# Patient Record
Sex: MALE | Race: WHITE | NOT HISPANIC OR LATINO | Employment: UNEMPLOYED | URBAN - METROPOLITAN AREA
[De-identification: names, ages, dates, MRNs, and addresses within clinical notes are randomized per-mention and may not be internally consistent; named-entity substitution may affect disease eponyms.]

---

## 2021-03-13 LAB
EXTERNAL HIV CONFIRMATION: NORMAL
EXTERNAL HIV CONFIRMATION: NORMAL
EXTERNAL HIV SCREEN: NORMAL
EXTERNAL HIV SCREEN: NORMAL

## 2022-04-18 LAB
HCV AB SER-ACNC: NEGATIVE
HCV AB SER-ACNC: NEGATIVE

## 2022-10-28 ENCOUNTER — OFFICE VISIT (OUTPATIENT)
Dept: FAMILY MEDICINE CLINIC | Facility: CLINIC | Age: 32
End: 2022-10-28
Payer: COMMERCIAL

## 2022-10-28 VITALS
BODY MASS INDEX: 29.59 KG/M2 | TEMPERATURE: 97.4 F | DIASTOLIC BLOOD PRESSURE: 70 MMHG | HEIGHT: 69 IN | RESPIRATION RATE: 16 BRPM | OXYGEN SATURATION: 99 % | SYSTOLIC BLOOD PRESSURE: 128 MMHG | HEART RATE: 84 BPM | WEIGHT: 199.8 LBS

## 2022-10-28 DIAGNOSIS — Z13.6 SCREENING FOR CARDIOVASCULAR CONDITION: ICD-10-CM

## 2022-10-28 DIAGNOSIS — Z00.00 ANNUAL PHYSICAL EXAM: Primary | ICD-10-CM

## 2022-10-28 DIAGNOSIS — M79.10 MYALGIA: ICD-10-CM

## 2022-10-28 DIAGNOSIS — F32.5 MAJOR DEPRESSIVE DISORDER IN FULL REMISSION, UNSPECIFIED WHETHER RECURRENT (HCC): ICD-10-CM

## 2022-10-28 DIAGNOSIS — E78.1 HYPERTRIGLYCERIDEMIA: ICD-10-CM

## 2022-10-28 DIAGNOSIS — K58.9 IRRITABLE BOWEL SYNDROME, UNSPECIFIED TYPE: ICD-10-CM

## 2022-10-28 DIAGNOSIS — E66.3 OVERWEIGHT: ICD-10-CM

## 2022-10-28 DIAGNOSIS — Z23 ENCOUNTER FOR IMMUNIZATION: ICD-10-CM

## 2022-10-28 DIAGNOSIS — Z13.1 SCREENING FOR DIABETES MELLITUS: ICD-10-CM

## 2022-10-28 DIAGNOSIS — F41.9 ANXIETY: ICD-10-CM

## 2022-10-28 DIAGNOSIS — K21.00 GASTROESOPHAGEAL REFLUX DISEASE WITH ESOPHAGITIS, UNSPECIFIED WHETHER HEMORRHAGE: ICD-10-CM

## 2022-10-28 PROBLEM — K21.9 GERD (GASTROESOPHAGEAL REFLUX DISEASE): Status: ACTIVE | Noted: 2022-10-28

## 2022-10-28 PROBLEM — F32.A DEPRESSION: Status: ACTIVE | Noted: 2022-10-28

## 2022-10-28 PROCEDURE — 90686 IIV4 VACC NO PRSV 0.5 ML IM: CPT

## 2022-10-28 PROCEDURE — 99385 PREV VISIT NEW AGE 18-39: CPT | Performed by: FAMILY MEDICINE

## 2022-10-28 PROCEDURE — 90471 IMMUNIZATION ADMIN: CPT

## 2022-10-28 RX ORDER — AMITRIPTYLINE HYDROCHLORIDE 25 MG/1
1 TABLET, FILM COATED ORAL
COMMUNITY
Start: 2022-08-17 | End: 2022-10-28 | Stop reason: SDUPTHER

## 2022-10-28 RX ORDER — AMITRIPTYLINE HYDROCHLORIDE 25 MG/1
25 TABLET, FILM COATED ORAL
Qty: 90 TABLET | Refills: 0 | Status: SHIPPED | OUTPATIENT
Start: 2022-10-28

## 2022-10-28 NOTE — PATIENT INSTRUCTIONS
Please contact your insurance if you are uncertain of coverage for plan of care items  Your insurance may not cover the cost of your Vitamin D blood test, which is approximately $65-70  Please notify the lab prior to blood draw if you would like to decline this test           Wellness Visit for Adults   AMBULATORY CARE:   A wellness visit  is when you see your healthcare provider to get screened for health problems  Your healthcare provider will also give you advice on how to stay healthy  Write down your questions so you remember to ask them  Ask your healthcare provider how often you should have a wellness visit  What happens at a wellness visit:  Your healthcare provider will ask about your health, and your family history of health problems  This includes high blood pressure, heart disease, and cancer  He or she will ask if you have symptoms that concern you, if you smoke, and about your mood  You may also be asked about your intake of medicines, supplements, food, and alcohol  Any of the following may be done: Your weight  will be checked  Your height may also be checked so your body mass index (BMI) can be calculated  Your BMI shows if you are at a healthy weight  Your blood pressure  and heart rate will be checked  Your temperature may also be checked  Blood and urine tests  may be done  Blood tests may be done to check your cholesterol levels  Abnormal cholesterol levels increase your risk for heart disease and stroke  You may also need a blood or urine test to check for diabetes if you are at increased risk  Urine tests may be done to look for signs of an infection or kidney disease  A physical exam  includes checking your heartbeat and lungs with a stethoscope  Your healthcare provider may also check your skin to look for sun damage  Screening tests  may be recommended  A screening test is done to check for diseases that may not cause symptoms   The screening tests you may need depend on your age, gender, family history, and lifestyle habits  For example, colorectal screening may be recommended if you are 48years old or older  Screening tests you need if you are a woman:   A Pap smear  is used to screen for cervical cancer  Pap smears are usually done every 3 to 5 years depending on your age  You may need them more often if you have had abnormal Pap smear test results in the past  Ask your healthcare provider how often you should have a Pap smear  A mammogram  is an x-ray of your breasts to screen for breast cancer  Experts recommend mammograms every 2 years starting at age 48 years  You may need a mammogram at age 52 years or younger if you have an increased risk for breast cancer  Talk to your healthcare provider about when you should start having mammograms and how often you need them  Vaccines you may need:   Get an influenza vaccine  every year  The influenza vaccine protects you from the flu  Several types of viruses cause the flu  The viruses change over time, so new vaccines are made each year  Get a tetanus-diphtheria (Td) booster vaccine  every 10 years  This vaccine protects you against tetanus and diphtheria  Tetanus is a severe infection that may cause painful muscle spasms and lockjaw  Diphtheria is a severe bacterial infection that causes a thick covering in the back of your mouth and throat  Get a human papillomavirus (HPV) vaccine  if you are female and aged 23 to 32 or male 23 to 24 and never received it  This vaccine protects you from HPV infection  HPV is the most common infection spread by sexual contact  HPV may also cause vaginal, penile, and anal cancers  Get a pneumococcal vaccine  if you are aged 72 years or older  The pneumococcal vaccine is an injection given to protect you from pneumococcal disease  Pneumococcal disease is an infection caused by pneumococcal bacteria  The infection may cause pneumonia, meningitis, or an ear infection      Get a shingles vaccine  if you are 61 or older, even if you have had shingles before  The shingles vaccine is an injection to protect you from the varicella-zoster virus  This is the same virus that causes chickenpox  Shingles is a painful rash that develops in people who had chickenpox or have been exposed to the virus  How to eat healthy:  My Plate is a model for planning healthy meals  It shows the types and amounts of foods that should go on your plate  Fruits and vegetables make up about half of your plate, and grains and protein make up the other half  A serving of dairy is included on the side of your plate  The amount of calories and serving sizes you need depends on your age, gender, weight, and height  Examples of healthy foods are listed below:  Eat a variety of vegetables  such as dark green, red, and orange vegetables  You can also include canned vegetables low in sodium (salt) and frozen vegetables without added butter or sauces  Eat a variety of fresh fruits , canned fruit in 100% juice, frozen fruit, and dried fruit  Include whole grains  At least half of the grains you eat should be whole grains  Examples include whole-wheat bread, wheat pasta, brown rice, and whole-grain cereals such as oatmeal     Eat a variety of protein foods such as seafood (fish and shellfish), lean meat, and poultry without skin (turkey and chicken)  Examples of lean meats include pork leg, shoulder, or tenderloin, and beef round, sirloin, tenderloin, and extra lean ground beef  Other protein foods include eggs and egg substitutes, beans, peas, soy products, nuts, and seeds  Choose low-fat dairy products such as skim or 1% milk or low-fat yogurt, cheese, and cottage cheese  Limit unhealthy fats  such as butter, hard margarine, and shortening  Exercise:  Exercise at least 30 minutes per day on most days of the week  Some examples of exercise include walking, biking, dancing, and swimming   You can also fit in more physical activity by taking the stairs instead of the elevator or parking farther away from stores  Include muscle strengthening activities 2 days each week  Regular exercise provides many health benefits  It helps you manage your weight, and decreases your risk for type 2 diabetes, heart disease, stroke, and high blood pressure  Exercise can also help improve your mood  Ask your healthcare provider about the best exercise plan for you  General health and safety guidelines:   Do not smoke  Nicotine and other chemicals in cigarettes and cigars can cause lung damage  Ask your healthcare provider for information if you currently smoke and need help to quit  E-cigarettes or smokeless tobacco still contain nicotine  Talk to your healthcare provider before you use these products  Limit alcohol  A drink of alcohol is 12 ounces of beer, 5 ounces of wine, or 1½ ounces of liquor  Lose weight, if needed  Being overweight increases your risk of certain health conditions  These include heart disease, high blood pressure, type 2 diabetes, and certain types of cancer  Protect your skin  Do not sunbathe or use tanning beds  Use sunscreen with a SPF 15 or higher  Apply sunscreen at least 15 minutes before you go outside  Reapply sunscreen every 2 hours  Wear protective clothing, hats, and sunglasses when you are outside  Drive safely  Always wear your seatbelt  Make sure everyone in your car wears a seatbelt  A seatbelt can save your life if you are in an accident  Do not use your cell phone when you are driving  This could distract you and cause an accident  Pull over if you need to make a call or send a text message  Practice safe sex  Use latex condoms if are sexually active and have more than one partner  Your healthcare provider may recommend screening tests for sexually transmitted infections (STIs)  Wear helmets, lifejackets, and protective gear    Always wear a helmet when you ride a bike or motorcycle, go skiing, or play sports that could cause a head injury  Wear protective equipment when you play sports  Wear a lifejacket when you are on a boat or doing water sports  © Copyright [employer] 2022 Information is for End User's use only and may not be sold, redistributed or otherwise used for commercial purposes  All illustrations and images included in CareNotes® are the copyrighted property of [employer] M [employer]  or [employer]idy Case   The above information is an  only  It is not intended as medical advice for individual conditions or treatments  Talk to your doctor, nurse or pharmacist before following any medical regimen to see if it is safe and effective for you  Weight Management   AMBULATORY CARE:   Why it is important to manage your weight:  Being overweight increases your risk of health conditions such as heart disease, high blood pressure, type 2 diabetes, and certain types of cancer  It can also increase your risk for osteoarthritis, sleep apnea, and other respiratory problems  Aim for a slow, steady weight loss  Even a small amount of weight loss can lower your risk of health problems  Risks of being overweight:  Extra weight can cause many health problems, including the following:  Diabetes (high blood sugar level)    High blood pressure or high cholesterol    Heart disease    Stroke    Gallbladder or liver disease    Cancer of the colon, breast, prostate, liver, or kidney    Sleep apnea    Arthritis or gout    Screening  is done to check for health conditions before you have signs or symptoms  If you are 28to 79years old, your blood sugar level may be checked every 3 years for signs of prediabetes or diabetes  Your healthcare provider will check your blood pressure at each visit  High blood pressure can lead to a stroke or other problems  Your provider may check for signs of heart disease, cancer, or other health problems    How to lose weight safely:  A safe and healthy way to lose weight is to eat fewer calories and get regular exercise  You can lose up about 1 pound a week by decreasing the number of calories you eat by 500 calories each day  You can decrease calories by eating smaller portion sizes or by cutting out high-calorie foods  Read labels to find out how many calories are in the foods you eat  You can also burn calories with exercise such as walking, swimming, or biking  You will be more likely to keep weight off if you make these changes part of your lifestyle  Exercise at least 30 minutes per day on most days of the week  You can also fit in more physical activity by taking the stairs instead of the elevator or parking farther away from stores  Ask your healthcare provider about the best exercise plan for you  Healthy meal plan for weight management:  A healthy meal plan includes a variety of foods, contains fewer calories, and helps you stay healthy  A healthy meal plan includes the following:     Eat whole-grain foods more often  A healthy meal plan should contain fiber  Fiber is the part of grains, fruits, and vegetables that is not broken down by your body  Whole-grain foods are healthy and provide extra fiber in your diet  Some examples of whole-grain foods are whole-wheat breads and pastas, oatmeal, brown rice, and bulgur  Eat a variety of vegetables every day  Include dark, leafy greens such as spinach, kale, chana greens, and mustard greens  Eat yellow and orange vegetables such as carrots, sweet potatoes, and winter squash  Eat a variety of fruits every day  Choose fresh or canned fruit (canned in its own juice or light syrup) instead of juice  Fruit juice has very little or no fiber  Eat low-fat dairy foods  Drink fat-free (skim) milk or 1% milk  Eat fat-free yogurt and low-fat cottage cheese  Try low-fat cheeses such as mozzarella and other reduced-fat cheeses      Choose meat and other protein foods that are low in fat   Choose beans or other legumes such as split peas or lentils  Choose fish, skinless poultry (chicken or turkey), or lean cuts of red meat (beef or pork)  Before you cook meat or poultry, cut off any visible fat  Use less fat and oil  Try baking foods instead of frying them  Add less fat, such as margarine, sour cream, regular salad dressing and mayonnaise to foods  Eat fewer high-fat foods  Some examples of high-fat foods include french fries, doughnuts, ice cream, and cakes  Eat fewer sweets  Limit foods and drinks that are high in sugar  This includes candy, cookies, regular soda, and sweetened drinks  Ways to decrease calories:   Eat smaller portions  Use a small plate with smaller servings  Do not eat second helpings  When you eat at a restaurant, ask for a box and place half of your meal in the box before you eat  Share an entrée with someone else  Replace high-calorie snacks with healthy, low-calorie snacks  Choose fresh fruit, vegetables, fat-free rice cakes, or air-popped popcorn instead of potato chips, nuts, or chocolate  Choose water or calorie-free drinks instead of soda or sweetened drinks  Do not shop for groceries when you are hungry  You may be more likely to make unhealthy food choices  Take a grocery list of healthy foods and shop after you have eaten  Eat regular meals  Do not skip meals  Skipping meals can lead to overeating later in the day  This can make it harder for you to lose weight  Eat a healthy snack in place of a meal if you do not have time to eat a regular meal  Talk with a dietitian to help you create a meal plan and schedule that is right for you  Other things to consider as you try to lose weight:   Be aware of situations that may give you the urge to overeat, such as eating while watching television  Find ways to avoid these situations  For example, read a book, go for a walk, or do crafts      Meet with a weight loss support group or friends who are also trying to lose weight  This may help you stay motivated to continue working on your weight loss goals  © Copyright [employer] 2022 Information is for End User's use only and may not be sold, redistributed or otherwise used for commercial purposes  All illustrations and images included in CareNotes® are the copyrighted property of [employer]  or Cassidy Case   The above information is an  only  It is not intended as medical advice for individual conditions or treatments  Talk to your doctor, nurse or pharmacist before following any medical regimen to see if it is safe and effective for you  Cholesterol and Your Health   AMBULATORY CARE:   Cholesterol  is a waxy, fat-like substance  Your body uses cholesterol to make hormones and new cells, and to protect nerves  Cholesterol is made by your body  It also comes from certain foods you eat, such as meat and dairy products  Your healthcare provider can help you set goals for your cholesterol levels  He or she can help you create a plan to meet your goals  Cholesterol level goals: Your cholesterol level goals depend on your risk for heart disease, your age, and your other health conditions  The following are general guidelines: Total cholesterol  includes low-density lipoprotein (LDL), high-density lipoprotein (HDL), and triglyceride levels  The total cholesterol level should be lower than 200 mg/dL and is best at about 150 mg/dL  LDL cholesterol  is called bad cholesterol  because it forms plaque in your arteries  As plaque builds up, your arteries become narrow, and less blood flows through  When plaque decreases blood flow to your heart, you may have chest pain  If plaque completely blocks an artery that brings blood to your heart, you may have a heart attack  Plaque can break off and form blood clots  Blood clots may block arteries in your brain and cause a stroke   The level should be less than 130 mg/dL and is best at about 100 mg/dL  HDL cholesterol  is called good cholesterol  because it helps remove LDL cholesterol from your arteries  It does this by attaching to LDL cholesterol and carrying it to your liver  Your liver breaks down LDL cholesterol so your body can get rid of it  High levels of HDL cholesterol can help prevent a heart attack and stroke  Low levels of HDL cholesterol can increase your risk for heart disease, heart attack, and stroke  The level should be 60 mg/dL or higher  Triglycerides  are a type of fat that store energy from foods you eat  High levels of triglycerides also cause plaque buildup  This can increase your risk for a heart attack or stroke  If your triglyceride level is high, your LDL cholesterol level may also be high  The level should be less than 150 mg/dL  Any of the following can increase your risk for high cholesterol:   Smoking cigarettes    Being overweight or obese, or not getting enough exercise    Drinking large amounts of alcohol    A medical condition such as hypertension (high blood pressure) or diabetes    Certain genes passed from your parents to you    Age older than 65 years    What you need to know about having your cholesterol levels checked: Adults 21to 39years of age should have their cholesterol levels checked every 4 to 6 years  Adults 45 years or older should have their cholesterol checked every 1 to 2 years  You may need your cholesterol checked more often, or at a younger age, if you have risk factors for heart disease  You may also need to have your cholesterol checked more often if you have other health conditions, such as diabetes  Blood tests are used to check cholesterol levels  Blood tests measure your levels of triglycerides, LDL cholesterol, and HDL cholesterol  How healthy fats affect your cholesterol levels:  Healthy fats, also called unsaturated fats, help lower LDL cholesterol and triglyceride levels   Healthy fats include the following:  Monounsaturated fats  are found in foods such as olive oil, canola oil, avocado, nuts, and olives  Polyunsaturated fats,  such as omega 3 fats, are found in fish, such as salmon, trout, and tuna  They can also be found in plant foods such as flaxseed, walnuts, and soybeans  How unhealthy fats affect your cholesterol levels:  Unhealthy fats increase LDL cholesterol and triglyceride levels  They are found in foods high in cholesterol, saturated fat, and trans fat:  Cholesterol  is found in eggs, dairy, and meat  Saturated fat  is found in butter, cheese, ice cream, whole milk, and coconut oil  Saturated fat is also found in meat, such as sausage, hot dogs, and bologna  Trans fat  is found in liquid oils and is used in fried and baked foods  Foods that contain trans fats include chips, crackers, muffins, sweet rolls, microwave popcorn, and cookies  Treatment  for high cholesterol will also decrease your risk of heart disease, heart attack, and stroke  Treatment may include any of the following:  Lifestyle changes  may include food, exercise, weight loss, and quitting smoking  You may also need to decrease the amount of alcohol you drink  Your healthcare provider will want you to start with lifestyle changes  Other treatment may be added if lifestyle changes are not enough  Your healthcare provider may recommend you work with a team to manage hyperlipidemia  The team may include medical experts such as a dietitian, an exercise or physical therapist, and a behavior therapist  Your family members may be included in helping you create lifestyle changes  Medicines  may be given to lower your LDL cholesterol, triglyceride levels, or total cholesterol level  You may need medicines to lower your cholesterol if any of the following is true:    You have a history of stroke, TIA, unstable angina, or a heart attack  Your LDL cholesterol level is 190 mg/dL or higher      You are age 36 to 76 years, have diabetes or heart disease risk factors, and your LDL cholesterol is 70 mg/dL or higher  Supplements  include fish oil, red yeast rice, and garlic  Fish oil may help lower your triglyceride and LDL cholesterol levels  It may also increase your HDL cholesterol level  Red yeast rice may help decrease your total cholesterol level and LDL cholesterol level  Garlic may help lower your total cholesterol level  Do not take any supplements without talking to your healthcare provider  Food changes you can make to lower your cholesterol levels:  A dietitian can help you create a healthy eating plan  He or she can show you how to read food labels and choose foods low in saturated fat, trans fats, and cholesterol  Decrease the total amount of fat you eat  Choose lean meats, fat-free or 1% fat milk, and low-fat dairy products, such as yogurt and cheese  Try to limit or avoid red meats  Limit or do not eat fried foods or baked goods, such as cookies  Replace unhealthy fats with healthy fats  Cook foods in olive oil or canola oil  Choose soft margarines that are low in saturated fat and trans fat  Seeds, nuts, and avocados are other examples of healthy fats  Eat foods with omega-3 fats  Examples include salmon, tuna, mackerel, walnuts, and flaxseed  Eat fish 2 times per week  Pregnant women should not eat fish that have high levels of mercury, such as shark, swordfish, and isahan mackerel  Increase the amount of high-fiber foods you eat  High-fiber foods can help lower your LDL cholesterol  Aim to get between 20 and 30 grams of fiber each day  Fruits and vegetables are high in fiber  Eat at least 5 servings each day  Other high-fiber foods are whole-grain or whole-wheat breads, pastas, or cereals, and brown rice  Eat 3 ounces of whole-grain foods each day  Increase fiber slowly  You may have abdominal discomfort, bloating, and gas if you add fiber to your diet too quickly           Eat healthy protein foods   Examples include low-fat dairy products, skinless chicken and turkey, fish, and nuts  Limit foods and drinks that are high in sugar  Your dietitian or healthcare provider can help you create daily limits for high-sugar foods and drinks  The limit may be lower if you have diabetes or another health condition  Limits can also help you lose weight if needed  Lifestyle changes you can make to lower your cholesterol levels:   Maintain a healthy weight  Ask your healthcare provider what a healthy weight is for you  Ask him or her to help you create a weight loss plan if needed  Weight loss can decrease your total cholesterol and triglyceride levels  Weight loss may also help keep your blood pressure at a healthy level  Be physically active throughout the day  Physical activity, such as exercise, can help lower your total cholesterol level and maintain a healthy weight  Physical activity can also help increase your HDL cholesterol level  Work with your healthcare provider to create an program that is right for you  Get at least 30 to 40 minutes of moderate physical activity most days of the week  Examples of exercise include brisk walking, swimming, or biking  Also include strength training at least 2 times each week  Your healthcare providers can help you create a physical activity plan  Do not smoke  Nicotine and other chemicals in cigarettes and cigars can raise your cholesterol levels  Ask your healthcare provider for information if you currently smoke and need help to quit  E-cigarettes or smokeless tobacco still contain nicotine  Talk to your healthcare provider before you use these products  Limit or do not drink alcohol  Alcohol can increase your triglyceride levels  Ask your healthcare provider before you drink alcohol  Ask how much is okay for you to drink in 24 hours or 1 week      Follow up with your doctor as directed:  Write down your questions so you remember to ask them during your visits  © Copyright [employer] 2022 Information is for End User's use only and may not be sold, redistributed or otherwise used for commercial purposes  All illustrations and images included in CareNotes® are the copyrighted property of A [employer] M [employer]  or Cassidy Ryder  The above information is an  only  It is not intended as medical advice for individual conditions or treatments  Talk to your doctor, nurse or pharmacist before following any medical regimen to see if it is safe and effective for you

## 2022-10-28 NOTE — PROGRESS NOTES
Assessment/Plan:  Problem List Items Addressed This Visit        Digestive    IBS (irritable bowel syndrome)     Stable on Elavil 25mg QD  Improved c dietary changes  Relevant Medications    amitriptyline (ELAVIL) 25 mg tablet    GERD (gastroesophageal reflux disease)     Improved c dietary changes  Watch GERD diet  Other    Overweight     Recommend lifestyle modifications  Depression     Stable on Elavil 25mg QD  Relevant Medications    amitriptyline (ELAVIL) 25 mg tablet    Other Relevant Orders    CBC and differential    Comprehensive metabolic panel    TSH, 3rd generation with Free T4 reflex    Hypertriglyceridemia     Pending labs  Recommend lifestyle modifications  Relevant Orders    CBC and differential    Comprehensive metabolic panel    Lipid panel    TSH, 3rd generation with Free T4 reflex    LDL cholesterol, direct    Anxiety     Stable on Elavil 25mg QD  Relevant Orders    CBC and differential    Comprehensive metabolic panel    TSH, 3rd generation with Free T4 reflex      Other Visit Diagnoses     Annual physical exam    -  Primary    Screening for diabetes mellitus        Relevant Orders    Hemoglobin A1C    Screening for cardiovascular condition        Relevant Orders    CBC and differential    Comprehensive metabolic panel    Lipid panel    LDL cholesterol, direct    Encounter for immunization        Relevant Orders    influenza vaccine, quadrivalent, 0 5 mL, preservative-free, for adult and pediatric patients 6 mos+ (AFLURIA, FLUARIX, FLULAVAL, FLUZONE) (Completed)    BMI 29 0-29 9,adult        Myalgia        Relevant Orders    Vitamin D 25 hydroxy           Return in about 6 weeks (around 12/9/2022) for F/U HyperTG, Anx/Dep, IBS, Labs        Future Appointments   Date Time Provider Angela Ba   12/15/2022  8:40 AM Kendell Grubbs DO [facility]        Subjective:     Ines Belcher is a 27 y o  male who presents today as a new patient for his medical conditions  New Patient    Previous PCP:  Dr Yfn Fenton at [address] [facility]  Reason for Transfer:  Insurance Change  Last seen by previous PCP:  22  Last Labs:  22  Last Physical:  3/12/21  Medical Records Requested:  No      HPI:  Chief Complaint   Patient presents with   • New Patient Visit     Here to establish care  Unknown last physical     • HM     Flu shot today, ordered  Unknown if pt has previously had HIV/Hep C screen  -- Above per clinical staff and reviewed  --      HPI      Today:      Controlled Substance Review    [place] or [place]  reviewed: No red flags were identified; safe to proceed with prescription  Overweight - Watching diet  No regular exercise  Has Peloton bike at home  Hypertriglyceridemia - No meds previously  Depression / Anxiety / IBS -Mood is stable  On Elavil 25mg 1 pill QHS  No higher doses or other mood meds previously  Good social supports  No SI/HI/AH/VH  No counseling previously  H/O of excessive ETOH use after losing job for 1 year previously - now drinking is controlled  PHQ-2/9 Depression Screening    Little interest or pleasure in doing things: 0 - not at all  Feeling down, depressed, or hopeless: 0 - not at all  Trouble falling or staying asleep, or sleeping too much: 0 - not at all  Feeling tired or having little energy: 0 - not at all  Poor appetite or overeatin - not at all  Feeling bad about yourself - or that you are a failure or have let yourself or your family down: 0 - not at all  Trouble concentrating on things, such as reading the newspaper or watching television: 0 - not at all  Moving or speaking so slowly that other people could have noticed   Or the opposite - being so fidgety or restless that you have been moving around a lot more than usual: 0 - not at all  Thoughts that you would be better off dead, or of hurting yourself in some way: 0 - not at all  PHQ-9 Score: 0   PHQ-9 Interpretation: No or Minimal depression          TERE-7 Flowsheet Screening    Flowsheet Row Most Recent Value   Over the last 2 weeks, how often have you been bothered by any of the following problems? Feeling nervous, anxious, or on edge 2   Not being able to stop or control worrying 1   Worrying too much about different things 1   Trouble relaxing 0   Being so restless that it is hard to sit still 0   Becoming easily annoyed or irritable 0   Feeling afraid as if something awful might happen 0   TERE-7 Total Score 4        MDQ:  0, No Problem    GERD - Stable  No longer eating red meat, rarely eating milk and dairy  Management per Jonas Zhou  Next appt PRN  S/p Normal HIDA scan, U/S, CT negative for gallstones  Seen by Dominic Horta Em 5/12/22 - next appt PRN  S/p Colonoscopy 5/26/22, EGD 2022 - GERD and erosions per patient  Previously on Omeprazole, Tums  Reviewed:  Labs 6/23/22, Gen Surgery  5/2/22    Overdue for Dentist   Ricky Monreal for Optho  The following portions of the patient's history were reviewed and updated as appropriate: allergies, current medications, past family history, past medical history, past social history, past surgical history and problem list       Review of Systems   Constitutional: Negative for appetite change, chills, diaphoresis, fatigue and fever  Respiratory: Negative for chest tightness and shortness of breath  Cardiovascular: Negative for chest pain  Gastrointestinal: Negative for abdominal pain, blood in stool, diarrhea, nausea and vomiting  Genitourinary: Negative for dysuria  Current Outpatient Medications   Medication Sig Dispense Refill   • amitriptyline (ELAVIL) 25 mg tablet Take 1 tablet (25 mg total) by mouth daily at bedtime 90 tablet 0   • Multiple Vitamins-Minerals (AIRBORNE PO) Take 1 tablet by mouth in the morning       No current facility-administered medications for this visit         Objective:  BP 128/70   Pulse 84   Temp (!) 97 4 °F (36 3 °C)   Resp 16   Ht 5' 8 75" (1 746 m)   Wt 90 6 kg (199 lb 12 8 oz)   SpO2 99%   BMI 29 72 kg/m²    Wt Readings from Last 3 Encounters:   10/28/22 90 6 kg (199 lb 12 8 oz)      BP Readings from Last 3 Encounters:   10/28/22 128/70          Physical Exam  Vitals and nursing note reviewed  Constitutional:       Appearance: Normal appearance  He is well-developed  HENT:      Head: Normocephalic and atraumatic  Right Ear: Tympanic membrane, ear canal and external ear normal       Left Ear: Tympanic membrane, ear canal and external ear normal       Nose: Nose normal       Right Sinus: No maxillary sinus tenderness or frontal sinus tenderness  Left Sinus: No maxillary sinus tenderness or frontal sinus tenderness  Mouth/Throat:      Mouth: Mucous membranes are moist       Pharynx: Oropharynx is clear  Uvula midline  No oropharyngeal exudate or posterior oropharyngeal erythema  Tonsils: No tonsillar exudate  Eyes:      Extraocular Movements: Extraocular movements intact  Conjunctiva/sclera: Conjunctivae normal       Pupils: Pupils are equal, round, and reactive to light  Cardiovascular:      Rate and Rhythm: Normal rate and regular rhythm  Pulses: Normal pulses  Heart sounds: Normal heart sounds  Pulmonary:      Effort: Pulmonary effort is normal       Breath sounds: Normal breath sounds  Abdominal:      General: Bowel sounds are normal  There is no distension  Palpations: Abdomen is soft  There is no mass  Tenderness: There is no abdominal tenderness  There is no guarding or rebound  Musculoskeletal:         General: No swelling or tenderness  Cervical back: Neck supple  Right lower leg: No edema  Left lower leg: No edema  Lymphadenopathy:      Cervical: No cervical adenopathy  Skin:     Findings: No rash  Neurological:      General: No focal deficit present        Mental Status: He is alert and oriented to person, place, and time  Psychiatric:         Mood and Affect: Mood normal          Behavior: Behavior normal          Thought Content: Thought content normal          Judgment: Judgment normal          Lab Results:      No results found for: WBC, HGB, HCT, PLT, CHOL, TRIG, HDL, LDLDIRECT, ALT, AST, NA, K, CL, CREATININE, BUN, CO2, TSH, PSA, INR, GLUF, HGBA1C, MICROALBUR  No results found for: URICACID  Invalid input(s): BASENAME Vitamin D    No results found  POCT Labs      BMI Counseling: Body mass index is 29 72 kg/m²  The BMI is above normal  Nutrition recommendations include encouraging healthy choices of fruits and vegetables  Exercise recommendations include exercising 3-5 times per week  No pharmacotherapy was ordered  Rationale for BMI follow-up plan is due to patient being overweight or obese

## 2022-11-01 ENCOUNTER — TELEPHONE (OUTPATIENT)
Dept: ADMINISTRATIVE | Facility: OTHER | Age: 32
End: 2022-11-01

## 2022-11-01 NOTE — TELEPHONE ENCOUNTER
Upon review of the In Basket request we were able to locate, review, and update the patient chart as requested for Hepatitis C  and HIV  Any additional questions or concerns should be emailed to the Practice Liaisons via the appropriate education email address, please do not reply via In Basket      Thank you  Deo Boyce

## 2022-11-01 NOTE — TELEPHONE ENCOUNTER
----- Message from Darryle Monk, DO sent at 10/28/2022 11:03 AM EDT -----  Regarding: Hep C 4/18/22, HIV 3/13/21 @ [facility]  10/28/22 11:03 AM    Hello, our patient Justino Gomes has had Hepatitis C and HIV completed/performed  Please assist in updating the patient chart by pulling the Care Everywhere (CE) document  The date of service is        Hep C 4/18/22, [facility]     Thank you,  Stefania Haro  [facility]

## 2022-11-09 ENCOUNTER — LAB (OUTPATIENT)
Dept: LAB | Facility: AMBULARY SURGERY CENTER | Age: 32
End: 2022-11-09

## 2022-11-09 DIAGNOSIS — M79.10 MYALGIA: ICD-10-CM

## 2022-11-09 DIAGNOSIS — Z13.1 SCREENING FOR DIABETES MELLITUS: ICD-10-CM

## 2022-11-09 DIAGNOSIS — E78.1 HYPERTRIGLYCERIDEMIA: ICD-10-CM

## 2022-11-09 DIAGNOSIS — Z13.6 SCREENING FOR CARDIOVASCULAR CONDITION: ICD-10-CM

## 2022-11-09 DIAGNOSIS — F41.9 ANXIETY: ICD-10-CM

## 2022-11-09 DIAGNOSIS — F32.5 MAJOR DEPRESSIVE DISORDER IN FULL REMISSION, UNSPECIFIED WHETHER RECURRENT (HCC): ICD-10-CM

## 2022-11-09 PROBLEM — E78.5 HYPERLIPIDEMIA: Status: ACTIVE | Noted: 2022-11-09

## 2022-11-09 LAB
25(OH)D3 SERPL-MCNC: 26.7 NG/ML (ref 30–100)
ALBUMIN SERPL BCP-MCNC: 4.1 G/DL (ref 3.5–5)
ALP SERPL-CCNC: 56 U/L (ref 46–116)
ALT SERPL W P-5'-P-CCNC: 107 U/L (ref 12–78)
ANION GAP SERPL CALCULATED.3IONS-SCNC: 2 MMOL/L (ref 4–13)
AST SERPL W P-5'-P-CCNC: 61 U/L (ref 5–45)
BASOPHILS # BLD AUTO: 0.03 THOUSANDS/ÂΜL (ref 0–0.1)
BASOPHILS NFR BLD AUTO: 1 % (ref 0–1)
BILIRUB SERPL-MCNC: 0.66 MG/DL (ref 0.2–1)
BUN SERPL-MCNC: 13 MG/DL (ref 5–25)
CALCIUM SERPL-MCNC: 10.2 MG/DL (ref 8.3–10.1)
CHLORIDE SERPL-SCNC: 102 MMOL/L (ref 96–108)
CHOLEST SERPL-MCNC: 194 MG/DL
CO2 SERPL-SCNC: 33 MMOL/L (ref 21–32)
CREAT SERPL-MCNC: 0.89 MG/DL (ref 0.6–1.3)
EOSINOPHIL # BLD AUTO: 0.2 THOUSAND/ÂΜL (ref 0–0.61)
EOSINOPHIL NFR BLD AUTO: 3 % (ref 0–6)
ERYTHROCYTE [DISTWIDTH] IN BLOOD BY AUTOMATED COUNT: 12.3 % (ref 11.6–15.1)
EST. AVERAGE GLUCOSE BLD GHB EST-MCNC: 91 MG/DL
GFR SERPL CREATININE-BSD FRML MDRD: 111 ML/MIN/1.73SQ M
GLUCOSE P FAST SERPL-MCNC: 95 MG/DL (ref 65–99)
HBA1C MFR BLD: 4.8 %
HCT VFR BLD AUTO: 43.5 % (ref 36.5–49.3)
HDLC SERPL-MCNC: 56 MG/DL
HGB BLD-MCNC: 14.8 G/DL (ref 12–17)
IMM GRANULOCYTES # BLD AUTO: 0.02 THOUSAND/UL (ref 0–0.2)
IMM GRANULOCYTES NFR BLD AUTO: 0 % (ref 0–2)
LDLC SERPL CALC-MCNC: 106 MG/DL (ref 0–100)
LDLC SERPL DIRECT ASSAY-MCNC: 117 MG/DL (ref 0–100)
LYMPHOCYTES # BLD AUTO: 1.94 THOUSANDS/ÂΜL (ref 0.6–4.47)
LYMPHOCYTES NFR BLD AUTO: 33 % (ref 14–44)
MCH RBC QN AUTO: 31.6 PG (ref 26.8–34.3)
MCHC RBC AUTO-ENTMCNC: 34 G/DL (ref 31.4–37.4)
MCV RBC AUTO: 93 FL (ref 82–98)
MONOCYTES # BLD AUTO: 0.6 THOUSAND/ÂΜL (ref 0.17–1.22)
MONOCYTES NFR BLD AUTO: 10 % (ref 4–12)
NEUTROPHILS # BLD AUTO: 3.08 THOUSANDS/ÂΜL (ref 1.85–7.62)
NEUTS SEG NFR BLD AUTO: 53 % (ref 43–75)
NONHDLC SERPL-MCNC: 138 MG/DL
NRBC BLD AUTO-RTO: 0 /100 WBCS
PLATELET # BLD AUTO: 295 THOUSANDS/UL (ref 149–390)
PMV BLD AUTO: 10 FL (ref 8.9–12.7)
POTASSIUM SERPL-SCNC: 4.4 MMOL/L (ref 3.5–5.3)
PROT SERPL-MCNC: 8.9 G/DL (ref 6.4–8.4)
RBC # BLD AUTO: 4.68 MILLION/UL (ref 3.88–5.62)
SODIUM SERPL-SCNC: 137 MMOL/L (ref 135–147)
TRIGL SERPL-MCNC: 159 MG/DL
TSH SERPL DL<=0.05 MIU/L-ACNC: 3.23 UIU/ML (ref 0.45–4.5)
WBC # BLD AUTO: 5.87 THOUSAND/UL (ref 4.31–10.16)

## 2022-11-09 NOTE — RESULT ENCOUNTER NOTE
Unstable labs - will review with patient at upcoming appointment  Hypercalcemia - Mild  Repeat CMP  Elevated liver enzymes - Repeat CMP, check Hepatitis panel  Elevated total protein - Push fluids  Repeat CMP  Hyperlipidemia - Recommend lifestyle modifications  Low vitamin D - Recommend start multivitamin and over-the-counter vitamin D3 1000 - 3000 International Units daily

## 2022-11-11 ENCOUNTER — APPOINTMENT (OUTPATIENT)
Dept: LAB | Facility: CLINIC | Age: 32
End: 2022-11-11

## 2022-11-11 ENCOUNTER — OCCMED (OUTPATIENT)
Dept: URGENT CARE | Facility: CLINIC | Age: 32
End: 2022-11-11

## 2022-11-11 DIAGNOSIS — Z02.1 ENCOUNTER FOR PRE-EMPLOYMENT HEALTH SCREENING EXAMINATION: Primary | ICD-10-CM

## 2022-11-11 DIAGNOSIS — Z02.1 ENCOUNTER FOR PRE-EMPLOYMENT HEALTH SCREENING EXAMINATION: ICD-10-CM

## 2022-11-11 LAB
MEV IGG SER QL IA: NORMAL
MUV IGG SER QL IA: NORMAL
RUBV IGG SERPL IA-ACNC: 47.1 IU/ML
VZV IGG SER QL IA: NORMAL

## 2022-11-14 LAB
GAMMA INTERFERON BACKGROUND BLD IA-ACNC: 0.05 IU/ML
M TB IFN-G BLD-IMP: NEGATIVE
M TB IFN-G CD4+ BCKGRND COR BLD-ACNC: -0.01 IU/ML
M TB IFN-G CD4+ BCKGRND COR BLD-ACNC: 0 IU/ML
MITOGEN IGNF BCKGRD COR BLD-ACNC: 5.64 IU/ML

## 2022-12-15 ENCOUNTER — OFFICE VISIT (OUTPATIENT)
Dept: FAMILY MEDICINE CLINIC | Facility: CLINIC | Age: 32
End: 2022-12-15

## 2022-12-15 VITALS
SYSTOLIC BLOOD PRESSURE: 134 MMHG | RESPIRATION RATE: 14 BRPM | TEMPERATURE: 97.6 F | DIASTOLIC BLOOD PRESSURE: 76 MMHG | BODY MASS INDEX: 29.83 KG/M2 | WEIGHT: 201.4 LBS | HEIGHT: 69 IN | HEART RATE: 77 BPM | OXYGEN SATURATION: 100 %

## 2022-12-15 DIAGNOSIS — E78.1 HYPERTRIGLYCERIDEMIA: ICD-10-CM

## 2022-12-15 DIAGNOSIS — R74.8 ELEVATED LIVER ENZYMES: ICD-10-CM

## 2022-12-15 DIAGNOSIS — K21.00 GASTROESOPHAGEAL REFLUX DISEASE WITH ESOPHAGITIS, UNSPECIFIED WHETHER HEMORRHAGE: ICD-10-CM

## 2022-12-15 DIAGNOSIS — F41.9 ANXIETY: ICD-10-CM

## 2022-12-15 DIAGNOSIS — R77.8 ELEVATED TOTAL PROTEIN: ICD-10-CM

## 2022-12-15 DIAGNOSIS — E78.5 HYPERLIPIDEMIA, UNSPECIFIED HYPERLIPIDEMIA TYPE: Primary | ICD-10-CM

## 2022-12-15 DIAGNOSIS — K58.9 IRRITABLE BOWEL SYNDROME, UNSPECIFIED TYPE: ICD-10-CM

## 2022-12-15 DIAGNOSIS — E83.52 HYPERCALCEMIA: ICD-10-CM

## 2022-12-15 DIAGNOSIS — E66.3 OVERWEIGHT: ICD-10-CM

## 2022-12-15 DIAGNOSIS — F32.5 MAJOR DEPRESSIVE DISORDER IN FULL REMISSION, UNSPECIFIED WHETHER RECURRENT (HCC): ICD-10-CM

## 2022-12-15 RX ORDER — AMITRIPTYLINE HYDROCHLORIDE 25 MG/1
25 TABLET, FILM COATED ORAL
Qty: 90 TABLET | Refills: 1 | Status: SHIPPED | OUTPATIENT
Start: 2022-12-15

## 2022-12-15 NOTE — PROGRESS NOTES
Assessment/Plan:  Problem List Items Addressed This Visit        Digestive    IBS (irritable bowel syndrome)     Stable on Elavil 25mg QD  Improved c dietary changes  Relevant Medications    amitriptyline (ELAVIL) 25 mg tablet    Other Relevant Orders    TSH, 3rd generation with Free T4 reflex    GERD (gastroesophageal reflux disease)     Improved c dietary changes  Watch GERD diet  Other    Overweight     Stable  Recommend lifestyle modifications  Depression     Stable on Elavil 25mg QD  Relevant Medications    amitriptyline (ELAVIL) 25 mg tablet    Other Relevant Orders    Comprehensive metabolic panel    TSH, 3rd generation with Free T4 reflex    Hypertriglyceridemia     Stable s Rx  Recommend lifestyle modifications  Relevant Orders    Comprehensive metabolic panel    Lipid panel    TSH, 3rd generation with Free T4 reflex    LDL cholesterol, direct    Anxiety     Stable on Elavil 25mg QD  Relevant Orders    TSH, 3rd generation with Free T4 reflex    Hyperlipidemia - Primary     Stable s statin  Recommend lifestyle modifications  Relevant Orders    Comprehensive metabolic panel    Lipid panel    TSH, 3rd generation with Free T4 reflex    LDL cholesterol, direct   Other Visit Diagnoses     Elevated liver enzymes        Relevant Orders    Comprehensive metabolic panel    Comprehensive metabolic panel    Pending labs  Elevated total protein        Relevant Orders    Comprehensive metabolic panel    Comprehensive metabolic panel    Pending labs  Push 64oz of water daily  Hypercalcemia        Relevant Orders    Comprehensive metabolic panel    Comprehensive metabolic panel    Pending labs  Return in about 6 months (around 6/15/2023) for 6mo -  HyperTG, Anx/Dep, IBS, Labs        Future Appointments   Date Time Provider Nick Swanson   6/15/2023  8:00 AM Arlyn Heart DO [facility] Subjective:     Kennedy Stephenson is a 27 y o  male who presents today for a follow-up on his chronic medical conditions  HPI:  Chief Complaint   Patient presents with   • Follow-up     F/u for chronics  No new problems or concerns at this time  Labs done      -- Above per clinical staff and reviewed  --      HPI      Today:    Return in about 6 weeks (around 2022) for F/U HyperTG, Anx/Dep, IBS, Labs        Overweight - Watching diet  No regular exercise  Has Peloton bike at home        Hypertriglyceridemia - No meds previously        Depression / Anxiety / IBS - Mood is stable  Increased stress due to starting new job in [employer] late   On Elavil 25mg 1 pill QHS  No higher doses or other mood meds previously  Good social supports  No SI/HI/AH/VH  No counseling previously  H/O of excessive ETOH use after losing job for 1 year previously - now drinking is controlled          PHQ-2/9 Depression Screening    Little interest or pleasure in doing things: 0 - not at all  Feeling down, depressed, or hopeless: 0 - not at all  Trouble falling or staying asleep, or sleeping too much: 0 - not at all  Feeling tired or having little energy: 0 - not at all  Poor appetite or overeatin - not at all  Feeling bad about yourself - or that you are a failure or have let yourself or your family down: 0 - not at all  Trouble concentrating on things, such as reading the newspaper or watching television: 0 - not at all  Moving or speaking so slowly that other people could have noticed   Or the opposite - being so fidgety or restless that you have been moving around a lot more than usual: 0 - not at all  Thoughts that you would be better off dead, or of hurting yourself in some way: 0 - not at all  PHQ-9 Score: 0   PHQ-9 Interpretation: No or Minimal depression          JAMIE-7 Flowsheet Screening    Flowsheet Row Most Recent Value   Over the last 2 weeks, how often have you been bothered by any of the following problems? Feeling nervous, anxious, or on edge 1   Not being able to stop or control worrying 0   Worrying too much about different things 0   Trouble relaxing 0   Being so restless that it is hard to sit still 0   Becoming easily annoyed or irritable 0   Feeling afraid as if something awful might happen 0   JAMIE-7 Total Score 1           GERD - Stable  No longer eating red meat, rarely eating milk and dairy  Management per Eduardo Guerra  Next appt PRN  S/p Normal HIDA scan, U/S, CT negative for gallstones  Seen by Kathe Rios 22 - next appt PRN  S/p Colonoscopy 22, EGD  - GERD and erosions per patient  Previously on Omeprazole, Tums              From previous note:    Controlled Substance Review     [place] or [place]  reviewed: No red flags were identified; safe to proceed with prescription       Overweight - Watching diet  No regular exercise  Has Peloton bike at home        Hypertriglyceridemia - No meds previously        Depression / Anxiety / IBS -Mood is stable  On Elavil 25mg 1 pill QHS  No higher doses or other mood meds previously  Good social supports  No SI/HI/AH/VH  No counseling previously  H/O of excessive ETOH use after losing job for 1 year previously - now drinking is controlled           PHQ-2/9 Depression Screening       Little interest or pleasure in doing things: 0 - not at all  Feeling down, depressed, or hopeless: 0 - not at all  Trouble falling or staying asleep, or sleeping too much: 0 - not at all  Feeling tired or having little energy: 0 - not at all  Poor appetite or overeatin - not at all  Feeling bad about yourself - or that you are a failure or have let yourself or your family down: 0 - not at all  Trouble concentrating on things, such as reading the newspaper or watching television: 0 - not at all  Moving or speaking so slowly that other people could have noticed   Or the opposite - being so fidgety or restless that you have been moving around a lot more than usual: 0 - not at all  Thoughts that you would be better off dead, or of hurting yourself in some way: 0 - not at all  PHQ-9 Score: 0   PHQ-9 Interpretation: No or Minimal depression                    JAMIE-7 Flowsheet Screening    Flowsheet Row Most Recent Value   Over the last 2 weeks, how often have you been bothered by any of the following problems?     Feeling nervous, anxious, or on edge 2   Not being able to stop or control worrying 1   Worrying too much about different things 1   Trouble relaxing 0   Being so restless that it is hard to sit still 0   Becoming easily annoyed or irritable 0   Feeling afraid as if something awful might happen 0   JAMIE-7 Total Score 4          MDQ:  0, No Problem     GERD - Stable  No longer eating red meat, rarely eating milk and dairy  Management per Niki Smith  Next appt PRN  S/p Normal HIDA scan, U/S, CT negative for gallstones  Seen by Charlotte Flores 5/12/22 - next appt PRN  S/p Colonoscopy 5/26/22, EGD 2022 - GERD and erosions per patient  Previously on Omeprazole, Tums        Reviewed:  Labs 11/11/22, 11/9/22, Gen Surgery  5/2/22     Overdue for Dentist   Lisbeth Barrientos for Optho  The following portions of the patient's history were reviewed and updated as appropriate: allergies, current medications, past family history, past medical history, past social history, past surgical history and problem list       Review of Systems   Constitutional: Negative for appetite change, chills, diaphoresis, fatigue and fever  Respiratory: Negative for chest tightness and shortness of breath  Cardiovascular: Negative for chest pain  Gastrointestinal: Negative for abdominal pain, blood in stool, diarrhea, nausea and vomiting  Genitourinary: Negative for dysuria          Current Outpatient Medications   Medication Sig Dispense Refill   • amitriptyline (ELAVIL) 25 mg tablet Take 1 tablet (25 mg total) by mouth daily at bedtime 90 tablet 1   • Multiple Vitamins-Minerals (AIRBORNE PO) Take 1 tablet by mouth in the morning       No current facility-administered medications for this visit  Objective:  /76   Pulse 77   Temp 97 6 °F (36 4 °C)   Resp 14   Ht 5' 8 75" (1 746 m)   Wt 91 4 kg (201 lb 6 4 oz)   SpO2 100%   BMI 29 96 kg/m²    Wt Readings from Last 3 Encounters:   12/15/22 91 4 kg (201 lb 6 4 oz)   10/28/22 90 6 kg (199 lb 12 8 oz)      BP Readings from Last 3 Encounters:   12/15/22 134/76   10/28/22 128/70          Physical Exam  Vitals and nursing note reviewed  Constitutional:       Appearance: Normal appearance  He is well-developed  HENT:      Head: Normocephalic and atraumatic  Eyes:      Conjunctiva/sclera: Conjunctivae normal    Neck:      Thyroid: No thyromegaly  Cardiovascular:      Rate and Rhythm: Normal rate and regular rhythm  Pulses: Normal pulses  Heart sounds: Normal heart sounds  Pulmonary:      Effort: Pulmonary effort is normal       Breath sounds: Normal breath sounds  Abdominal:      General: Bowel sounds are normal  There is no distension  Palpations: Abdomen is soft  There is no mass  Tenderness: There is abdominal tenderness (Mild) in the right lower quadrant  There is no guarding or rebound  Musculoskeletal:         General: No swelling or tenderness  Cervical back: Neck supple  Right lower leg: No edema  Left lower leg: No edema  Neurological:      General: No focal deficit present  Mental Status: He is alert and oriented to person, place, and time  Psychiatric:         Mood and Affect: Mood normal          Behavior: Behavior normal          Thought Content:  Thought content normal          Judgment: Judgment normal          Lab Results:      Lab Results   Component Value Date    WBC 5 87 11/09/2022    HGB 14 8 11/09/2022    HCT 43 5 11/09/2022     11/09/2022    TRIG 159 (H) 11/09/2022    HDL 56 11/09/2022    LDLDIRECT 117 (H) 11/09/2022     (H) 11/09/2022    AST 61 (H) 11/09/2022    K 4 4 11/09/2022     11/09/2022    CREATININE 0 89 11/09/2022    BUN 13 11/09/2022    CO2 33 (H) 11/09/2022    GLUF 95 11/09/2022    HGBA1C 4 8 11/09/2022     No results found for: URICACID  Invalid input(s): BASENAME Vitamin D    No results found       POCT Labs

## 2023-05-01 DIAGNOSIS — K58.9 IRRITABLE BOWEL SYNDROME, UNSPECIFIED TYPE: ICD-10-CM

## 2023-05-01 DIAGNOSIS — F32.5 MAJOR DEPRESSIVE DISORDER IN FULL REMISSION, UNSPECIFIED WHETHER RECURRENT (HCC): ICD-10-CM

## 2023-05-02 RX ORDER — AMITRIPTYLINE HYDROCHLORIDE 25 MG/1
25 TABLET, FILM COATED ORAL
Qty: 90 TABLET | Refills: 0 | Status: SHIPPED | OUTPATIENT
Start: 2023-05-02

## 2023-05-19 ENCOUNTER — APPOINTMENT (OUTPATIENT)
Dept: LAB | Facility: AMBULARY SURGERY CENTER | Age: 33
End: 2023-05-19

## 2023-05-19 ENCOUNTER — TRANSCRIBE ORDERS (OUTPATIENT)
Dept: LAB | Facility: AMBULARY SURGERY CENTER | Age: 33
End: 2023-05-19

## 2023-05-19 ENCOUNTER — LAB (OUTPATIENT)
Dept: LAB | Facility: AMBULARY SURGERY CENTER | Age: 33
End: 2023-05-19

## 2023-05-19 DIAGNOSIS — E78.1 HYPERTRIGLYCERIDEMIA: ICD-10-CM

## 2023-05-19 DIAGNOSIS — F32.5 MAJOR DEPRESSIVE DISORDER IN FULL REMISSION, UNSPECIFIED WHETHER RECURRENT (HCC): ICD-10-CM

## 2023-05-19 DIAGNOSIS — R77.8 ELEVATED TOTAL PROTEIN: ICD-10-CM

## 2023-05-19 DIAGNOSIS — F41.9 ANXIETY: ICD-10-CM

## 2023-05-19 DIAGNOSIS — E78.5 HYPERLIPIDEMIA, UNSPECIFIED HYPERLIPIDEMIA TYPE: ICD-10-CM

## 2023-05-19 DIAGNOSIS — E83.52 HYPERCALCEMIA: ICD-10-CM

## 2023-05-19 DIAGNOSIS — R74.8 ELEVATED LIVER ENZYMES: ICD-10-CM

## 2023-05-19 DIAGNOSIS — Z00.8 HEALTH EXAMINATION IN POPULATION SURVEY: ICD-10-CM

## 2023-05-19 DIAGNOSIS — K58.9 IRRITABLE BOWEL SYNDROME, UNSPECIFIED TYPE: ICD-10-CM

## 2023-05-19 DIAGNOSIS — Z00.8 HEALTH EXAMINATION IN POPULATION SURVEY: Primary | ICD-10-CM

## 2023-05-19 LAB
ALBUMIN SERPL BCP-MCNC: 4.2 G/DL (ref 3.5–5)
ALP SERPL-CCNC: 48 U/L (ref 46–116)
ALT SERPL W P-5'-P-CCNC: 47 U/L (ref 12–78)
ANION GAP SERPL CALCULATED.3IONS-SCNC: 3 MMOL/L (ref 4–13)
AST SERPL W P-5'-P-CCNC: 30 U/L (ref 5–45)
BILIRUB SERPL-MCNC: 0.51 MG/DL (ref 0.2–1)
BUN SERPL-MCNC: 7 MG/DL (ref 5–25)
CALCIUM SERPL-MCNC: 9.6 MG/DL (ref 8.3–10.1)
CHLORIDE SERPL-SCNC: 104 MMOL/L (ref 96–108)
CHOLEST SERPL-MCNC: 203 MG/DL
CO2 SERPL-SCNC: 29 MMOL/L (ref 21–32)
CREAT SERPL-MCNC: 0.87 MG/DL (ref 0.6–1.3)
EST. AVERAGE GLUCOSE BLD GHB EST-MCNC: 88 MG/DL
GFR SERPL CREATININE-BSD FRML MDRD: 111 ML/MIN/1.73SQ M
GLUCOSE SERPL-MCNC: 87 MG/DL (ref 65–140)
HBA1C MFR BLD: 4.7 %
HDLC SERPL-MCNC: 52 MG/DL
LDLC SERPL CALC-MCNC: 113 MG/DL (ref 0–100)
LDLC SERPL DIRECT ASSAY-MCNC: 121 MG/DL (ref 0–100)
NONHDLC SERPL-MCNC: 151 MG/DL
POTASSIUM SERPL-SCNC: 4.3 MMOL/L (ref 3.5–5.3)
PROT SERPL-MCNC: 8.4 G/DL (ref 6.4–8.4)
SODIUM SERPL-SCNC: 136 MMOL/L (ref 135–147)
TRIGL SERPL-MCNC: 190 MG/DL
TSH SERPL DL<=0.05 MIU/L-ACNC: 2.5 UIU/ML (ref 0.45–4.5)

## 2023-05-19 PROCEDURE — 84443 ASSAY THYROID STIM HORMONE: CPT

## 2023-05-19 PROCEDURE — 83721 ASSAY OF BLOOD LIPOPROTEIN: CPT

## 2023-05-19 PROCEDURE — 80053 COMPREHEN METABOLIC PANEL: CPT

## 2023-05-19 PROCEDURE — 36415 COLL VENOUS BLD VENIPUNCTURE: CPT

## 2023-06-14 NOTE — PROGRESS NOTES
Assessment/Plan:  Problem List Items Addressed This Visit        Digestive    IBS (irritable bowel syndrome)     Improved c dietary changes  Stable on Elavil 25mg QD - patient desires wean  Wean Elavil 25m/2 tab by mouth nightly x 1 week, then 1/2 tab by mouth every other night x 1 week, then stop  Relevant Orders    TSH, 3rd generation with Free T4 reflex    GERD (gastroesophageal reflux disease)     Improved c dietary changes  Watch GERD diet  Other    Depression     Stable on Elavil 25mg QD - patient desires wean  Wean Elavil 25m/2 tab by mouth nightly x 1 week, then 1/2 tab by mouth every other night x 1 week, then stop  Relevant Orders    TSH, 3rd generation with Free T4 reflex    Hypertriglyceridemia     Stable s Rx  Recommend lifestyle modifications  Relevant Orders    CBC and differential    Comprehensive metabolic panel    Lipid panel    LDL cholesterol, direct    Anxiety     Stable on Elavil 25mg QD - patient desires wean  Wean Elavil 25m/2 tab by mouth nightly x 1 week, then 1/2 tab by mouth every other night x 1 week, then stop  Relevant Orders    TSH, 3rd generation with Free T4 reflex    Hyperlipidemia - Primary     Stable s statin  Recommend lifestyle modifications  Relevant Orders    Lipid panel    TSH, 3rd generation with Free T4 reflex    LDL cholesterol, direct    Class 1 obesity with serious comorbidity and body mass index (BMI) of 33 0 to 33 9 in adult     Worsening  Recommend lifestyle modifications  Relevant Orders    Vitamin D 25 hydroxy   Other Visit Diagnoses     BMI 33 0-33 9,adult        Relevant Orders    Vitamin D 25 hydroxy    Vasectomy evaluation        Relevant Orders    Ambulatory Referral to Urology    Left groin pain        Relevant Orders    Ambulatory Referral to General Surgery    No left inguinal hernia appreciated    Pending General Surgery evaluation for consideration of CT Abd / Pelvis PRN  Low vitamin D level        Relevant Orders    Vitamin D 25 hydroxy    Screening for cardiovascular condition        Relevant Orders    CBC and differential    Comprehensive metabolic panel    Lipid panel    LDL cholesterol, direct           Return in about 6 months (around 12/15/2023) for Physical / 6mo -  HyperTG, Anx/Dep, IBS, Labs  Future Appointments   Date Time Provider Nick Swanson   12/15/2023  8:40 AM Meagan Kendrick, DO [facility]        Subjective:     Amanda Tracey is a 26 y o  male who presents today for a follow-up on his chronic medical conditions  HPI:  Chief Complaint   Patient presents with   • Follow-up     Follow-up disposition: Return in about 6 months (around 6/15/2023) for 6mo - HyperTG, Anx/Dep, IBS, Labs   • Hernia     Noticed a few months ago   • Vasectomy     Wants to discuss process of vasectomy, not established with urology     -- Above per clinical staff and reviewed  --      HPI      Today:      Return in about 6 months (around 6/15/2023) for 6mo -  HyperTG, Anx/Dep, IBS, Labs  6mo OV    Left Groin Pressure / Pain - Symptoms x months  Worse c Valsalva  Obesity - Watching diet   No regular exercise   Has Peloton bike at home        Hyperlipidemia / Hypertriglyceridemia - No meds previously        Depression / Anxiety / IBS - Mood is stable  On Elavil 25mg 1 pill QHS and patient desires wean   No higher doses or other mood meds previously   Good social supports   No SI/HI/AH/VH  [facility] previously    H/O of excessive ETOH use after losing job for 1 year previously - now drinking is controlled           PHQ-2/9 Depression Screening    Little interest or pleasure in doing things: 0 - not at all  Feeling down, depressed, or hopeless: 0 - not at all  Trouble falling or staying asleep, or sleeping too much: 0 - not at all  Feeling tired or having little energy: 0 - not at all  Poor appetite or overeatin - not at all  Feeling bad about yourself - or that you are a failure or have let yourself or your family down: 0 - not at all  Trouble concentrating on things, such as reading the newspaper or watching television: 0 - not at all  Moving or speaking so slowly that other people could have noticed  Or the opposite - being so fidgety or restless that you have been moving around a lot more than usual: 0 - not at all  Thoughts that you would be better off dead, or of hurting yourself in some way: 0 - not at all  PHQ-9 Score: 0   PHQ-9 Interpretation: No or Minimal depression          JAMIE-7 Flowsheet Screening    Flowsheet Row Most Recent Value   Over the last 2 weeks, how often have you been bothered by any of the following problems? Feeling nervous, anxious, or on edge 0   Not being able to stop or control worrying 0   Worrying too much about different things 0   Trouble relaxing 0   Being so restless that it is hard to sit still 0   Becoming easily annoyed or irritable 0   Feeling afraid as if something awful might happen 0   JAMIE-7 Total Score 0          GERD - Stable   No longer eating red meat, rarely eating milk and dairy   Management per [facility] Dr Hiwot Rosado   Next appt PRN   S/p Normal HIDA scan, U/S, CT negative for gallstones   Seen by [facility] Gen Surgery Dr Danii Alberts 5/12/22 - next appt PRN   S/p Colonoscopy 5/26/22, EGD 2022 - GERD and erosions per patient   Previously on Omeprazole, Tums           Reviewed:  Labs 5/19/23, Gen Surgery  5/2/22         The following portions of the patient's history were reviewed and updated as appropriate: allergies, current medications, past family history, past medical history, past social history, past surgical history and problem list       Review of Systems   Constitutional: Negative for appetite change, chills, diaphoresis, fatigue and fever  Respiratory: Negative for chest tightness and shortness of breath  Cardiovascular: Negative for chest pain     Gastrointestinal: Negative for abdominal pain, "blood in stool, diarrhea, nausea and vomiting  Genitourinary: Negative for dysuria  Current Outpatient Medications   Medication Sig Dispense Refill   • Multiple Vitamins-Minerals (AIRBORNE PO) Take 1 tablet by mouth in the morning       No current facility-administered medications for this visit  Objective:  /82   Pulse 88   Temp 98 1 °F (36 7 °C) (Temporal)   Resp 20   Ht 5' 8\" (1 727 m)   Wt 100 kg (220 lb 6 4 oz)   SpO2 98%   BMI 33 51 kg/m²    Wt Readings from Last 3 Encounters:   06/15/23 100 kg (220 lb 6 4 oz)   12/15/22 91 4 kg (201 lb 6 4 oz)   10/28/22 90 6 kg (199 lb 12 8 oz)      BP Readings from Last 3 Encounters:   06/15/23 110/82   12/15/22 134/76   10/28/22 128/70          Physical Exam  Vitals and nursing note reviewed  Exam conducted with a chaperone present (Patricia [place], [place])  Constitutional:       Appearance: Normal appearance  He is well-developed  He is obese  HENT:      Head: Normocephalic and atraumatic  Eyes:      Conjunctiva/sclera: Conjunctivae normal    Neck:      Thyroid: No thyromegaly  Cardiovascular:      Rate and Rhythm: Normal rate and regular rhythm  Pulses: Normal pulses  Heart sounds: Normal heart sounds  Pulmonary:      Effort: Pulmonary effort is normal       Breath sounds: Normal breath sounds  Abdominal:      General: Bowel sounds are normal  There is no distension  Palpations: Abdomen is soft  There is no mass  Tenderness: There is no abdominal tenderness  There is no guarding or rebound  Hernia: No hernia is present  There is no hernia in the left inguinal area or right inguinal area  Genitourinary:     Penis: Circumcised  Testes: Normal          Right: Tenderness or swelling not present  Left: Tenderness or swelling not present  Epididymis:      Right: Normal       Left: Normal       Comments: Left groin tenderness  Musculoskeletal:      Cervical back: Neck supple     Lymphadenopathy: " "     Cervical: No cervical adenopathy  Lower Body: No right inguinal adenopathy  No left inguinal adenopathy  Neurological:      Mental Status: He is alert  Lab Results:      Lab Results   Component Value Date    ALT 47 05/19/2023    AST 30 05/19/2023    BUN 7 05/19/2023     05/19/2023    CO2 29 05/19/2023    CREATININE 0 87 05/19/2023    GLUF 95 11/09/2022    HCT 43 5 11/09/2022    HDL 52 05/19/2023    HGB 14 8 11/09/2022    HGBA1C 4 7 05/19/2023    K 4 3 05/19/2023    LDLDIRECT 121 (H) 05/19/2023     11/09/2022    TRIG 190 (H) 05/19/2023    WBC 5 87 11/09/2022     No results found for: \"URICACID\"  Invalid input(s): \"BASENAME\" Vitamin D    No results found  POCT Labs      BMI Counseling: Body mass index is 33 51 kg/m²  The BMI is above normal  Nutrition recommendations include encouraging healthy choices of fruits and vegetables  Exercise recommendations include exercising 3-5 times per week  No pharmacotherapy was ordered  Rationale for BMI follow-up plan is due to patient being overweight or obese  BMI Counseling: Body mass index is 33 51 kg/m²  The BMI is above normal  Nutrition recommendations include 3-5 servings of fruits/vegetables daily  Exercise recommendations include exercising 3-5 times per week    "

## 2023-06-15 ENCOUNTER — TELEPHONE (OUTPATIENT)
Dept: FAMILY MEDICINE CLINIC | Facility: CLINIC | Age: 33
End: 2023-06-15

## 2023-06-15 ENCOUNTER — OFFICE VISIT (OUTPATIENT)
Dept: FAMILY MEDICINE CLINIC | Facility: CLINIC | Age: 33
End: 2023-06-15
Payer: COMMERCIAL

## 2023-06-15 VITALS
OXYGEN SATURATION: 98 % | WEIGHT: 220.4 LBS | HEART RATE: 88 BPM | TEMPERATURE: 98.1 F | SYSTOLIC BLOOD PRESSURE: 110 MMHG | RESPIRATION RATE: 20 BRPM | DIASTOLIC BLOOD PRESSURE: 82 MMHG | HEIGHT: 68 IN | BODY MASS INDEX: 33.4 KG/M2

## 2023-06-15 DIAGNOSIS — Z30.09 VASECTOMY EVALUATION: ICD-10-CM

## 2023-06-15 DIAGNOSIS — K58.9 IRRITABLE BOWEL SYNDROME, UNSPECIFIED TYPE: ICD-10-CM

## 2023-06-15 DIAGNOSIS — E78.5 HYPERLIPIDEMIA, UNSPECIFIED HYPERLIPIDEMIA TYPE: Primary | ICD-10-CM

## 2023-06-15 DIAGNOSIS — R10.32 LEFT GROIN PAIN: ICD-10-CM

## 2023-06-15 DIAGNOSIS — R79.89 LOW VITAMIN D LEVEL: ICD-10-CM

## 2023-06-15 DIAGNOSIS — E66.9 CLASS 1 OBESITY WITH SERIOUS COMORBIDITY AND BODY MASS INDEX (BMI) OF 33.0 TO 33.9 IN ADULT, UNSPECIFIED OBESITY TYPE: ICD-10-CM

## 2023-06-15 DIAGNOSIS — K21.00 GASTROESOPHAGEAL REFLUX DISEASE WITH ESOPHAGITIS, UNSPECIFIED WHETHER HEMORRHAGE: ICD-10-CM

## 2023-06-15 DIAGNOSIS — E78.1 HYPERTRIGLYCERIDEMIA: ICD-10-CM

## 2023-06-15 DIAGNOSIS — F32.5 MAJOR DEPRESSIVE DISORDER IN FULL REMISSION, UNSPECIFIED WHETHER RECURRENT (HCC): ICD-10-CM

## 2023-06-15 DIAGNOSIS — Z13.6 SCREENING FOR CARDIOVASCULAR CONDITION: ICD-10-CM

## 2023-06-15 DIAGNOSIS — F41.9 ANXIETY: ICD-10-CM

## 2023-06-15 PROBLEM — E66.811 CLASS 1 OBESITY WITH SERIOUS COMORBIDITY AND BODY MASS INDEX (BMI) OF 33.0 TO 33.9 IN ADULT: Status: ACTIVE | Noted: 2023-06-15

## 2023-06-15 PROBLEM — E66.3 OVERWEIGHT: Status: RESOLVED | Noted: 2022-10-28 | Resolved: 2023-06-15

## 2023-06-15 PROCEDURE — 99214 OFFICE O/P EST MOD 30 MIN: CPT | Performed by: FAMILY MEDICINE

## 2023-06-15 NOTE — TELEPHONE ENCOUNTER
Provider: Dr Jaswinder Burrows    Name of form: Luige Jona 10 Results Form    From placed: given to Dr Jaswinder Burrows to complete    Form to be given to patient once completed (either in person or e-mail)  Patient was made aware of the 7-10 business day form policy

## 2023-06-15 NOTE — PATIENT INSTRUCTIONS
Wean Elavil 25m/2 tab by mouth nightly x 1 week, then 1/2 tab by mouth every other night x 1 week, then stop  Obesity   AMBULATORY CARE:   Obesity  means your body mass index (BMI) is greater than 30  Your healthcare provider will use your age, height, and weight to measure your BMI  The risks of obesity include  many health problems, including injuries or physical disability  Diabetes (high blood sugar level)    High blood pressure or high cholesterol    Heart disease    Stroke    Gallbladder or liver disease    Cancer of the colon, breast, prostate, liver, or kidney    Sleep apnea    Arthritis or gout    Screening  is done to check for health conditions before you have signs or symptoms  If you are 28to 79years old, your blood sugar level may be checked every 3 years for signs of prediabetes or diabetes  Your healthcare provider will check your blood pressure at each visit  High blood pressure can lead to a stroke or other problems  Your provider may check for signs of heart disease, cancer, or other health problems  Seek care immediately if:   You have a severe headache, confusion, or difficulty speaking  You have weakness on one side of your body  You have chest pain, sweating, or shortness of breath  Call your doctor if:   You have symptoms of gallbladder or liver disease, such as pain in your upper abdomen  You have knee or hip pain and discomfort while walking  You have symptoms of diabetes, such as intense hunger and thirst, and frequent urination  You have symptoms of sleep apnea, such as snoring or daytime sleepiness  You have questions or concerns about your condition or care  Treatment for obesity  focuses on helping you lose weight to improve your health  Even a small decrease in BMI can reduce the risk for many health problems  Your healthcare provider will help you set a weight-loss goal   Lifestyle changes  are the first step in treating obesity   These include making healthy food choices and getting regular physical activity  Your healthcare provider may suggest a weight-loss program that involves coaching, education, and therapy  Medicine  may help you lose weight when it is used with a healthy foods and physical activity  Surgery  can help you lose weight if you have obesity along with other health problems  Several types of weight-loss surgery are available  Ask your healthcare provider for more information  Tips for safe weight loss:   Set small, realistic goals  An example of a small goal is to walk for 20 minutes 5 days a week  Anther goal is to lose 5% of your body weight  Ask for support  Tell friends, family members, and coworkers about your goals  Ask someone to lose weight with you  You may also want to join a weight-loss support group  Identify foods or triggers that may cause you to overeat  Remove tempting high-calorie foods from your home and workplace  Place a bowl of fresh fruit on your kitchen counter  If stress causes you to eat, find other ways to cope with stress  A counselor or therapist may be able to help you  Track your daily calories and activity  Write down what you eat and drink  Also write down how many minutes of physical activity you do each day  Track your weekly weight  Weigh yourself in the morning, before you eat or drink anything but after you use the bathroom  Use the same scale, in the same place, and in similar clothing each time  Only weigh yourself 1 to 2 times each week, or as directed  You may become discouraged if you weigh yourself every day  Eating changes: You will need to eat 500 to 1,000 fewer calories each day than you currently eat to lose 1 to 2 pounds a week  The following changes will help you cut calories:  Eat smaller portions  Use small plates, no larger than 9 inches in diameter  Fill your plate half full of fruits and vegetables   Measure your food using measuring cups until you know what a serving size looks like  Eat 3 meals and 1 or 2 snacks each day  Plan your meals in advance  Trent Silva and eat at home most of the time  Eat slowly  Do not skip meals  Skipping meals can lead to overeating later in the day  This can make it harder for you to lose weight  Talk with a dietitian to help you make a meal plan and schedule that is right for you  Eat fruits and vegetables at every meal   They are low in calories and high in fiber, which makes you feel full  Do not add butter, margarine, or cream sauce to vegetables  Use herbs to season steamed vegetables  Eat less fat and fewer fried foods  Eat more baked or grilled chicken and fish  These protein sources are lower in calories and fat than red meat  Limit fast food  Dress your salads with olive oil and vinegar instead of bottled dressing  Limit the amount of sugar you eat  Do not drink sugary beverages  Limit alcohol  Activity changes:  Physical activity is good for your body in many ways  It helps you burn calories and build strong muscles  It decreases stress and depression, and improves your mood  It can also help you sleep better  Talk to your healthcare provider before you begin an exercise program   Exercise for at least 30 minutes 5 days a week  Start slowly  Set aside time each day for physical activity that you enjoy and that is convenient for you  It is best to do both weight training and an activity that increases your heart rate, such as walking, bicycling, or swimming  Find ways to be more active  Do yard work and housecleaning  Walk up the stairs instead of using elevators  Spend your leisure time going to events that require walking, such as outdoor festivals or fairs  This extra physical activity can help you lose weight and keep it off  Follow up with your doctor as directed: You may need to meet with a dietitian  Write down your questions so you remember to ask them during your visits    © Copyright [employer] 2022 Information is for End User's use only and may not be sold, redistributed or otherwise used for commercial purposes  The above information is an  only  It is not intended as medical advice for individual conditions or treatments  Talk to your doctor, nurse or pharmacist before following any medical regimen to see if it is safe and effective for you

## 2023-06-27 ENCOUNTER — CONSULT (OUTPATIENT)
Dept: SURGERY | Facility: CLINIC | Age: 33
End: 2023-06-27
Payer: COMMERCIAL

## 2023-06-27 VITALS
WEIGHT: 220 LBS | DIASTOLIC BLOOD PRESSURE: 82 MMHG | HEIGHT: 68 IN | BODY MASS INDEX: 33.34 KG/M2 | HEART RATE: 98 BPM | SYSTOLIC BLOOD PRESSURE: 124 MMHG

## 2023-06-27 DIAGNOSIS — R10.32 LEFT GROIN PAIN: ICD-10-CM

## 2023-06-27 PROCEDURE — 99242 OFF/OP CONSLTJ NEW/EST SF 20: CPT | Performed by: SURGERY

## 2023-06-27 NOTE — PROGRESS NOTES
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 [employer]   Patient presents to the office complaining of mild left inguinal ache for last 6 months  This is an intermittent pain which is not related to standing, straining or lifting  There has been no lump felt by the patient  There has been no progression in patient's symptoms and the ache continues to be mild not affecting the patient's lifestyle  Patient has no past history of hernia    Patient has no urinary or bowel complaints    The following portions of the patient's history were reviewed and updated as appropriate: allergies, current medications, past family history, past medical history, past social history, past surgical history, and problem list     Review of Systems    No significant medical disease    Patient was investigated for abdominal complaints last year and had undergone a CT scan in April 2022  At that time no hernia was noticed on the CAT scan  Objective:    /82   Pulse 98   Ht 5' 8\" (1 727 m)   Wt 99 8 kg (220 lb)   BMI 33 45 kg/m²      Physical Exam    Patient was examined in the standing position  No hernia was seen or felt on coughing or straining or on finger invagination  There was no tenderness upon palpation of the left inguinal canal and no tenderness over the os pubis      The external genitalia and testicles are normal   "

## 2023-07-14 ENCOUNTER — TELEPHONE (OUTPATIENT)
Dept: UROLOGY | Facility: MEDICAL CENTER | Age: 33
End: 2023-07-14

## 2023-07-14 NOTE — TELEPHONE ENCOUNTER
New Patient    What is the reason for the patient’s appointment?:  Vasectomy evaluation  What office location does the patient prefer?:  [facility]   Does patient have Imaging/Lab Results:    Have patient records been requested?:  If No, are the records showing in Epic:       INSURANCE:   Do we accept the patient's insurance or is the patient Self-Pay?:    Insurance Provider:[email or web address]   Plan Type/Number:   Member ID#:       HISTORY:   Has the patient had any previous Urologist(s)?:  No  Was the patient seen in the ED?:  No  Has the patient had any outside testing done?:  No  Does the patient have a personal history of cancer?:  NO

## 2023-08-25 ENCOUNTER — CONSULT (OUTPATIENT)
Dept: UROLOGY | Facility: CLINIC | Age: 33
End: 2023-08-25
Payer: COMMERCIAL

## 2023-08-25 VITALS
DIASTOLIC BLOOD PRESSURE: 80 MMHG | WEIGHT: 227 LBS | SYSTOLIC BLOOD PRESSURE: 130 MMHG | HEART RATE: 113 BPM | OXYGEN SATURATION: 99 % | BODY MASS INDEX: 34.4 KG/M2 | HEIGHT: 68 IN

## 2023-08-25 DIAGNOSIS — Z30.09 VASECTOMY EVALUATION: ICD-10-CM

## 2023-08-25 PROCEDURE — 99203 OFFICE O/P NEW LOW 30 MIN: CPT | Performed by: PHYSICIAN ASSISTANT

## 2023-08-25 NOTE — PROGRESS NOTES
1. Vasectomy evaluation  Ambulatory Referral to Urology    Case request operating room: VASECTOMY    Case request operating room: VASECTOMY          Assessment and plan:       We had a long discussion regarding the options for birth control. I told the patient that vasectomy is considered to be a permanent surgical sterilization procedure. We spoke about other options including the possibility of vasectomy reversal at a later time. He understands that vasectomy confers no immunity to STDs. I also told him that according to our present knowledge, there is no causal relationship between vasectomy and subsequent development of prostate cancer or testicular cancer. No change in libido erection or ejaculation. We spoke about the potential complications. The most common one in the short term is scrotal hematoma and infection, which rarely requires re-operation. Additionally, he can react to the anesthetic, develop scrotal swelling, have pain or skin bruising. We spoke about post procedure care to try to minimize this complication. I also asked him to refrain from aspirin or fish oil products and alcohol prior to the procedure. The long-term complications include but are not limited to vasectomy failure by recanalization, chronic epididymal discomfort, pain, among other possibilities. Due to difficult anatomy, this procedure will be best performed under IV sedation in the OF. Finally, he understands that following vasectomy, he’ll need to use other means of birth control until he’s had semen analyses that demonstrate the absence of sperm. He understands it will be his responsibility to submit these semen specimens and call our office for the results. I told him again that recanalization is a small but real possibility, and if he is ever concerned about it he can submit another semen specimen for analysis.       After discussing the risks, benefits, possible complications and alternatives, informed consents were obtained. Chief Complaint     Desire for vasectomy      History of Present Illness     Christiano Mccartney is a 26 y.o. male referred for evaluation of vasectomy. He has 2 biological children. He states that he and his partner have come to the mutual decision they do not desire any additional children. He has no prior past medical, past surgical, or past urologic history     Review of Systems     Review of Systems   Constitutional: Negative for activity change and fatigue. HENT: Negative for congestion. Eyes: Negative for visual disturbance. Respiratory: Negative for shortness of breath and wheezing. Cardiovascular: Negative for chest pain and leg swelling. Gastrointestinal: Negative for abdominal pain. Endocrine: Negative for polyuria. Genitourinary: Negative for dysuria, flank pain, hematuria and urgency. Musculoskeletal: Negative for back pain. Allergic/Immunologic: Negative for immunocompromised state. Neurological: Negative for dizziness and numbness. Psychiatric/Behavioral: Negative for dysphoric mood. All other systems reviewed and are negative. Allergies     Allergies   Allergen Reactions   • Amoxicillin Rash       Physical Exam     Physical Exam   Constitutional: He is oriented to person, place, and time. He appears well-developed and well-nourished. No distress. HENT:   Head: Normocephalic and atraumatic. Eyes: EOM are normal.   Neck: Normal range of motion. Cardiovascular:   Negative lower extremity edema. RRR. No r/c/g/m  Pulmonary/Chest: Effort normal and breath sounds normal. No wheezes  Abdominal: Soft. Genitourinary:   Genitourinary Comments: Vas deferens markedly difficult to palpate bilaterally. Musculoskeletal: Normal range of motion. Neurological: He is alert and oriented to person, place, and time. Skin: Skin is warm. Psychiatric: He has a normal mood and affect.  His behavior is normal.         Vital Signs  Vitals:    08/25/23 0755   BP: 130/80   BP Location: Left arm   Patient Position: Sitting   Cuff Size: Standard   Pulse: (!) 113   SpO2: 99%   Weight: 103 kg (227 lb)   Height: 5' 8" (1.727 m)         Current Medications       Current Outpatient Medications:   •  Multiple Vitamins-Minerals (AIRBORNE PO), Take 1 tablet by mouth in the morning, Disp: , Rfl:       Active Problems     Patient Active Problem List   Diagnosis   • GERD (gastroesophageal reflux disease)   • Depression   • Hypertriglyceridemia   • IBS (irritable bowel syndrome)   • Anxiety   • Hyperlipidemia   • Class 1 obesity with serious comorbidity and body mass index (BMI) of 33.0 to 33.9 in adult         Past Medical History     Past Medical History:   Diagnosis Date   • Anxiety    • Class 1 obesity with serious comorbidity and body mass index (BMI) of 33.0 to 33.9 in adult 6/15/2023   • Depression    • GERD (gastroesophageal reflux disease)    • Hyperlipidemia    • Hypertriglyceridemia    • IBS (irritable bowel syndrome)    • Overweight          Surgical History     Past Surgical History:   Procedure Laterality Date   • COLONOSCOPY  2022    Stable   • EGD      GERD, Erosions   • KNEE ARTHROSCOPY Right    • TONSILLECTOMY Bilateral     as a child         Family History     Family History   Problem Relation Age of Onset   • COPD Mother         +Smoker   • AVM Mother         Brain   • No Known Problems Father    • No Known Problems Half-Sister    • Drug abuse Half-Brother         Heroin   • No Known Problems Half-Brother    • Asthma Son    • Diabetes type II Paternal Grandmother    • Diabetes type II Paternal Grandfather          Social History     Social History     Social History     Tobacco Use   Smoking Status Former   • Packs/day: 0.50   • Years: 1.00   • Total pack years: 0.50   • Types: Cigarettes   • Start date: 2017   • Quit date: 2018   • Years since quittin.6   Smokeless Tobacco Former   • Types: Chew   • Quit date: 2022 Portions of the record may have been created with voice recognition software. Occasional wrong word or "sound a like" substitutions may have occurred due to the inherent limitations of voice recognition software. Read the chart carefully and recognize, using context, where substitutions have occurred.

## 2023-09-12 ENCOUNTER — TELEPHONE (OUTPATIENT)
Dept: UROLOGY | Facility: CLINIC | Age: 33
End: 2023-09-12

## 2023-09-20 NOTE — TELEPHONE ENCOUNTER
Spoke w/ pt/pt  Scheduled for 11/10/2023 w/ Dr Lyndon Charlton @ [facility]/Surgical packet emailed to pt

## 2023-10-27 ENCOUNTER — ANESTHESIA EVENT (OUTPATIENT)
Dept: PERIOP | Facility: AMBULARY SURGERY CENTER | Age: 33
End: 2023-10-27
Payer: COMMERCIAL

## 2023-11-01 NOTE — PRE-PROCEDURE INSTRUCTIONS
Pre-Surgery Instructions:   Medication Instructions    Multiple Vitamins-Minerals (AIRBORNE PO) Stop taking 7 days prior to surgery. Spoke with pt via phone. Medication instructions for day surgery reviewed. Please use only a sip of water to take your instructed medications. Avoid all over the counter vitamins, supplements and NSAIDS for one week prior to surgery per anesthesia guidelines. Tylenol is ok to take as needed. You will receive a call one business day prior to surgery with an arrival time and hospital directions. If your surgery is scheduled on a Monday, the hospital will be calling you on the Friday prior to your surgery. If you have not heard from anyone by 8pm, please call the hospital supervisor through the hospital  at [phone]. Jazlynia Sarah [phone]). Do not eat or drink anything after midnight the night before your surgery, including candy, mints, lifesavers, or chewing gum. Do not drink alcohol 24hrs before your surgery. Try not to smoke at least 24hrs before your surgery. Follow the pre surgery showering instructions as listed in the [facility] Surgical Experience Booklet” or otherwise provided by your surgeon's office. Do not use a blade to shave the surgical area 1 week before surgery. It is okay to use a clean electric clippers up to 24 hours before surgery. Do not apply any lotions, creams, including makeup, cologne, deodorant, or perfumes after showering on the day of your surgery. Do not use dry shampoo, hair spray, hair gel, or any type of hair products. No contact lenses, eye make-up, or artificial eyelashes. Remove nail polish, including gel polish, and any artificial, gel, or acrylic nails if possible. Remove all jewelry including rings and body piercing jewelry. Wear causal clothing that is easy to take on and off. Consider your type of surgery. Keep any valuables, jewelry, piercings at home.  Please bring any specially ordered equipment (sling, braces) if indicated. Arrange for a responsible person to drive you to and from the hospital on the day of your surgery. Visitor Guidelines discussed. Call the surgeon's office with any new illnesses, exposures, or additional questions prior to surgery. Please reference your [facility] Surgical Experience Booklet” for additional information to prepare for your upcoming surgery.

## 2023-11-10 ENCOUNTER — ANESTHESIA (OUTPATIENT)
Dept: PERIOP | Facility: AMBULARY SURGERY CENTER | Age: 33
End: 2023-11-10
Payer: COMMERCIAL

## 2023-11-10 ENCOUNTER — HOSPITAL ENCOUNTER (OUTPATIENT)
Facility: AMBULARY SURGERY CENTER | Age: 33
Setting detail: OUTPATIENT SURGERY
Discharge: HOME | End: 2023-11-10
Attending: UROLOGY | Admitting: UROLOGY
Payer: COMMERCIAL

## 2023-11-10 VITALS
SYSTOLIC BLOOD PRESSURE: 147 MMHG | DIASTOLIC BLOOD PRESSURE: 82 MMHG | OXYGEN SATURATION: 100 % | HEART RATE: 78 BPM | RESPIRATION RATE: 16 BRPM | TEMPERATURE: 97.2 F

## 2023-11-10 DIAGNOSIS — Z30.2 ENCOUNTER FOR VASECTOMY: Primary | ICD-10-CM

## 2023-11-10 DIAGNOSIS — Z30.09 VASECTOMY EVALUATION: ICD-10-CM

## 2023-11-10 PROCEDURE — 55250 REMOVAL OF SPERM DUCT(S): CPT | Performed by: UROLOGY

## 2023-11-10 PROCEDURE — NC001 PR NO CHARGE: Performed by: UROLOGY

## 2023-11-10 PROCEDURE — 88302 TISSUE EXAM BY PATHOLOGIST: CPT | Performed by: PATHOLOGY

## 2023-11-10 RX ORDER — LIDOCAINE HYDROCHLORIDE 10 MG/ML
INJECTION, SOLUTION EPIDURAL; INFILTRATION; INTRACAUDAL; PERINEURAL AS NEEDED
Status: DISCONTINUED | OUTPATIENT
Start: 2023-11-10 | End: 2023-11-10

## 2023-11-10 RX ORDER — OXYCODONE HYDROCHLORIDE 5 MG/1
5 TABLET ORAL EVERY 4 HOURS PRN
Status: DISCONTINUED | OUTPATIENT
Start: 2023-11-10 | End: 2023-11-10 | Stop reason: HOSPADM

## 2023-11-10 RX ORDER — CEFAZOLIN SODIUM 2 G/50ML
2000 SOLUTION INTRAVENOUS ONCE
Status: COMPLETED | OUTPATIENT
Start: 2023-11-10 | End: 2023-11-10

## 2023-11-10 RX ORDER — SODIUM CHLORIDE, SODIUM LACTATE, POTASSIUM CHLORIDE, CALCIUM CHLORIDE 600; 310; 30; 20 MG/100ML; MG/100ML; MG/100ML; MG/100ML
INJECTION, SOLUTION INTRAVENOUS CONTINUOUS PRN
Status: DISCONTINUED | OUTPATIENT
Start: 2023-11-10 | End: 2023-11-10

## 2023-11-10 RX ORDER — PROPOFOL 10 MG/ML
INJECTION, EMULSION INTRAVENOUS CONTINUOUS PRN
Status: DISCONTINUED | OUTPATIENT
Start: 2023-11-10 | End: 2023-11-10

## 2023-11-10 RX ORDER — DEXAMETHASONE SODIUM PHOSPHATE 10 MG/ML
INJECTION, SOLUTION INTRAMUSCULAR; INTRAVENOUS AS NEEDED
Status: DISCONTINUED | OUTPATIENT
Start: 2023-11-10 | End: 2023-11-10

## 2023-11-10 RX ORDER — MIDAZOLAM HYDROCHLORIDE 2 MG/2ML
INJECTION, SOLUTION INTRAMUSCULAR; INTRAVENOUS AS NEEDED
Status: DISCONTINUED | OUTPATIENT
Start: 2023-11-10 | End: 2023-11-10

## 2023-11-10 RX ORDER — HYDROMORPHONE HCL/PF 1 MG/ML
0.25 SYRINGE (ML) INJECTION
Status: DISCONTINUED | OUTPATIENT
Start: 2023-11-10 | End: 2023-11-10 | Stop reason: HOSPADM

## 2023-11-10 RX ORDER — ONDANSETRON 2 MG/ML
INJECTION INTRAMUSCULAR; INTRAVENOUS AS NEEDED
Status: DISCONTINUED | OUTPATIENT
Start: 2023-11-10 | End: 2023-11-10

## 2023-11-10 RX ORDER — MAGNESIUM HYDROXIDE 1200 MG/15ML
LIQUID ORAL AS NEEDED
Status: DISCONTINUED | OUTPATIENT
Start: 2023-11-10 | End: 2023-11-10 | Stop reason: HOSPADM

## 2023-11-10 RX ORDER — LIDOCAINE HYDROCHLORIDE 10 MG/ML
INJECTION, SOLUTION EPIDURAL; INFILTRATION; INTRACAUDAL; PERINEURAL AS NEEDED
Status: DISCONTINUED | OUTPATIENT
Start: 2023-11-10 | End: 2023-11-10 | Stop reason: HOSPADM

## 2023-11-10 RX ORDER — FENTANYL CITRATE 50 UG/ML
INJECTION, SOLUTION INTRAMUSCULAR; INTRAVENOUS AS NEEDED
Status: DISCONTINUED | OUTPATIENT
Start: 2023-11-10 | End: 2023-11-10

## 2023-11-10 RX ORDER — FENTANYL CITRATE/PF 50 MCG/ML
50 SYRINGE (ML) INJECTION
Status: DISCONTINUED | OUTPATIENT
Start: 2023-11-10 | End: 2023-11-10 | Stop reason: HOSPADM

## 2023-11-10 RX ORDER — ONDANSETRON 2 MG/ML
4 INJECTION INTRAMUSCULAR; INTRAVENOUS ONCE AS NEEDED
Status: DISCONTINUED | OUTPATIENT
Start: 2023-11-10 | End: 2023-11-10 | Stop reason: HOSPADM

## 2023-11-10 RX ADMIN — DEXAMETHASONE SODIUM PHOSPHATE 10 MG: 10 INJECTION INTRAMUSCULAR; INTRAVENOUS at 07:36

## 2023-11-10 RX ADMIN — ONDANSETRON 4 MG: 2 INJECTION INTRAMUSCULAR; INTRAVENOUS at 07:28

## 2023-11-10 RX ADMIN — FENTANYL CITRATE 25 MCG: 50 INJECTION INTRAMUSCULAR; INTRAVENOUS at 07:44

## 2023-11-10 RX ADMIN — PROPOFOL 150 MCG/KG/MIN: 10 INJECTION, EMULSION INTRAVENOUS at 07:31

## 2023-11-10 RX ADMIN — FENTANYL CITRATE 50 MCG: 50 INJECTION INTRAMUSCULAR; INTRAVENOUS at 07:31

## 2023-11-10 RX ADMIN — SODIUM CHLORIDE, SODIUM LACTATE, POTASSIUM CHLORIDE, AND CALCIUM CHLORIDE: .6; .31; .03; .02 INJECTION, SOLUTION INTRAVENOUS at 07:10

## 2023-11-10 RX ADMIN — FENTANYL CITRATE 25 MCG: 50 INJECTION INTRAMUSCULAR; INTRAVENOUS at 07:39

## 2023-11-10 RX ADMIN — MIDAZOLAM 2 MG: 1 INJECTION INTRAMUSCULAR; INTRAVENOUS at 07:28

## 2023-11-10 RX ADMIN — LIDOCAINE HYDROCHLORIDE 50 MG: 10 INJECTION, SOLUTION EPIDURAL; INFILTRATION; INTRACAUDAL; PERINEURAL at 07:31

## 2023-11-10 RX ADMIN — CEFAZOLIN SODIUM 2000 MG: 2 SOLUTION INTRAVENOUS at 07:28

## 2023-11-10 NOTE — ANESTHESIA POSTPROCEDURE EVALUATION
Post-Op Assessment Note    CV Status:  Stable  Pain Score: 0    Pain management: adequate     Mental Status:  Sleepy and arousable   PONV Controlled:  None   Airway Patency:  Patent      Post Op Vitals Reviewed: Yes      Staff: CRNA         No notable events documented.     BP   114/59   Temp   97.4   Pulse  74   Resp   14   SpO2   100

## 2023-11-10 NOTE — DISCHARGE INSTR - AVS FIRST PAGE
Mr. Mitch Torres,     Your vasectomy went well without issues. -Try to have minimal activity for 3 days to reduce the risk for bleeding/hematoma. - After 3 days you can move as desired but no heavy lifting or running for 1-2 weeks. No ejaculations for 1-2 weeks. No submerging the  incisions below water for 2 weeks. -If you can try to put something cold and frozen like a bag of frozen peas against your scrotum for 10-20 minutes on, then off for 30 minutes and repeat as much as possible for the first day or two to help reduce swelling. Do not put ice directly against the scrotum (should be in a bag or some other barrier like cloth)  - Use tylenol and motrin for pain control  - The skin sutures will dissolve on their own. Some skin separation is not uncommon and not concerning.  -Some mild bleeding from the incisions as well as swelling of the scrotum is normal.  - Contact us for signs of infection, internal bleeding or persistent pain. Internal bleeding would usually show up with rapid enlargement of one side of the scrotum or other associated with pain  - Plan to ejaculate approximately 20-30 times before performing a semen analysis. If this does not show sperm or shows rare nonmotile sperm then you are considered infertile but until a semen analysis is performed with these results you should be using protection with intercourse.     Onelia Esparza MD  [facility] [phone]

## 2023-11-10 NOTE — H&P
UROLOGY HISTORY AND PHYSICAL     Patient Identifiers: Caitlin Church (MRN [id number])      Date of Service: 11/10/2023        ASSESSMENT:     26 y.o. old male here for vasectomy under sedation. PLAN:     Vasectomy under sedation      History of Present Illness:     Caitlin Church is a 26 y.o. old who desires vasectomy. He has 2 biological children. He states that he and his partner have come to the mutual decision they do not desire any additional children. He has no prior past medical, past surgical, or past urologic history    There was difficulty palpating his vasa bilaterally in the clinic to schedule for the procedure to be under sedation. Past Medical, Past Surgical History:     Past Medical History:   Diagnosis Date    Anxiety     Class 1 obesity with serious comorbidity and body mass index (BMI) of 33.0 to 33.9 in adult 6/15/2023    Depression     GERD (gastroesophageal reflux disease)     Hyperlipidemia     Hypertriglyceridemia     IBS (irritable bowel syndrome)     Overweight    :    Past Surgical History:   Procedure Laterality Date    COLONOSCOPY  2022    Stable    EGD      GERD, Erosions    KNEE ARTHROSCOPY Right 2012    TONSILLECTOMY Bilateral     as a child   :    Medications, Allergies:     Current Facility-Administered Medications:     ceFAZolin (ANCEF) IVPB (premix in dextrose) 2,000 mg 50 mL, 2,000 mg, Intravenous, Once, Regina Peña PA-C    Allergies:   Allergies   Allergen Reactions    Amoxicillin Rash   :    Social and Family History:   Social History:   Social History     Tobacco Use    Smoking status: Former     Packs/day: 0.50     Years: 1.00     Total pack years: 0.50     Types: Cigarettes     Start date: 2017     Quit date: 2018     Years since quittin.8    Smokeless tobacco: Former     Types: Chew     Quit date: 2022   Vaping Use    Vaping Use: Former    Start date: 2011    Quit date: 2012    Substances: Nicotine, THC, CBD, Flavoring   Substance Use Topics    Alcohol use: Not Currently    Drug use: Not Currently   . Social History     Tobacco Use   Smoking Status Former    Packs/day: 0.50    Years: 1.00    Total pack years: 0.50    Types: Cigarettes    Start date: 2017    Quit date: 2018    Years since quittin.8   Smokeless Tobacco Former    Types: Chew    Quit date: 2022       Family History:  Family History   Problem Relation Age of Onset    COPD Mother         +Smoker    AVM Mother         Brain    No Known Problems Father     No Known Problems Half-Sister     Drug abuse Half-Brother         Heroin    No Known Problems Half-Brother     Asthma Son     Diabetes type II Paternal Grandmother     Diabetes type II Paternal Grandfather    :     Review of Systems:     General: Fever, chills, or night sweats: negative  Cardiac: Negative for chest pain. Pulmonary: Negative for shortness of breath. Gastrointestinal: Abdominal pain negative  Nausea, vomiting, or diarrhea negative  Genitourinary: See HPI above. Patient does nothave hematuria. All other systems queried were negative. Physical Exam:   General: Patient is pleasant and in NAD. Awake and alert  /87   Pulse 83   Temp 98 °F (36.7 °C) (Temporal)   Resp 16   SpO2 99%   HEENT:  Normocephalic atraumatic  Cardiac:  Regular rate and rhythm, Peripheral edema: negative  Pulmonary: Non-labored breathing, CTAB  Abdomen: Soft, non-tender, non-distended. No surgical scars. No masses, tenderness, hernias noted. Genitourinary: negative CVA tenderness, neg suprapubic tenderness. I could appreciate a vasa bilaterally.   Extremities: normal movement in all 4       Labs:     Lab Results   Component Value Date    HGB 14.8 2022    HCT 43.5 2022    WBC 5.87 2022     2022   ]    Lab Results   Component Value Date    K 4.3 2023     2023    CO2 29 2023    BUN 7 2023    CREATININE 0.87 2023    CALCIUM 9.6 05/19/2023   ]    Imaging:   I personally reviewed the images and report of the following studies, and reviewed them with the patient:    No relevant imaging    Thank you for allowing me to participate in this patients’ care. Please do not hesitate to call with any additional questions.   Richard Harris MD

## 2023-11-10 NOTE — ANESTHESIA PREPROCEDURE EVALUATION
Procedure:  VASECTOMY (Scrotum)    Relevant Problems   CARDIO   (+) Hyperlipidemia   (+) Hypertriglyceridemia      GI/HEPATIC   (+) GERD (gastroesophageal reflux disease)      NEURO/PSYCH   (+) Anxiety   (+) Depression        Physical Exam    Airway    Mallampati score: III  TM Distance: >3 FB  Neck ROM: full     Dental   No notable dental hx     Cardiovascular      Pulmonary      Other Findings        Anesthesia Plan  ASA Score- 1     Anesthesia Type- IV sedation with anesthesia with ASA Monitors. Additional Monitors:     Airway Plan:            Plan Factors-Exercise tolerance (METS): >4 METS. Chart reviewed. Patient summary reviewed. Induction- intravenous. Postoperative Plan-     Informed Consent- Anesthetic plan and risks discussed with patient. I personally reviewed this patient with the CRNA. Discussed and agreed on the Anesthesia Plan with the CRNA. Thelma Jacques

## 2023-11-10 NOTE — OP NOTE
OPERATIVE REPORT  PATIENT NAME: Bakari Villalpando    :  1990  MRN: [id number]  Pt Location: [facility] OR [facility]OM 04    SURGERY DATE: 11/10/2023    Surgeon(s) and Role:     * Keshav Duarte MD - Primary    Preop Diagnosis:  Vasectomy evaluation [Z30.09]    Post-Op Diagnosis Codes: * Vasectomy evaluation [Z30.09]    Procedure(s):  VASECTOMY    Specimen(s):  ID Type Source Tests Collected by Time Destination   1 :  Tissue Vas Deferens, Left TISSUE EXAM Keshav Duarte MD 11/10/2023 7179    2 :  Tissue Vas Deferens, Right TISSUE EXAM Keshav Duarte MD 11/10/2023 0028        Estimated Blood Loss:   Minimal    Drains:  * No LDAs found *    Anesthesia Type:   IV Sedation with Anesthesia    Operative Indications:  Patient desiring vasectomy with difficulty palpating vasa in clinic so set up to be performed under sedation. Operative Findings:  Tight scrotum and small vasa but palpable bilaterally. vasectomy performed in standard fashion    Complications:   None    Procedure and Technique:  The patient brought the operative table. He anesthesia was used. He was prepped and draped in standard sterile fashion. A timeout was performed to identify the correct patient, site, and procedure. Exam was performed to confirm the vas deferens bilaterally. Attention was turned to the left side. The vas deferens was isolated and the skin over this anesthetized. The skin was sharply opened with dissecting forceps. The vas deferens was encircled with a vas clamp. The vaginal sheath was opened with cautery. Further dissection was performed to strip the vas of nearby vasculature. A section of vas was then cut sharply. The lumens of the proximal and distal segments were then each intubated with needle point Bovie and cauterized. The proximal vas segment was dunked between tissue and overlying tissue cauterized to create a tissue bridge. Hemostasis was evaluated and was appropriate.     Attention was now turned to the right side.  In a similar fashion the vas deferens was isolated, vasall sheath opened,  a section of vas cut out, then the lumens of proximal and distal aspects of vas intubated and cauterized and finally with overlying tissue bridge created over the proximal aspect. Hemostasis was evaluated and was appropriate. The dartos on each side was cauterized and a 3-0 chromic suture was used to close the skin. Bacitracin and gauze dressing was placed. I was present for all critical portions of the procedure     A qualified resident physician was not available.     Patient Disposition:  PACU         SIGNATURE: Ivette Mcpherson MD  DATE: November 10, 2023  TIME: 7:55 AM

## 2023-11-19 PROCEDURE — 88302 TISSUE EXAM BY PATHOLOGIST: CPT | Performed by: PATHOLOGY

## 2023-12-08 DIAGNOSIS — Z00.6 ENCOUNTER FOR EXAMINATION FOR NORMAL COMPARISON OR CONTROL IN CLINICAL RESEARCH PROGRAM: ICD-10-CM

## 2023-12-12 ENCOUNTER — LAB (OUTPATIENT)
Dept: LAB | Facility: CLINIC | Age: 33
End: 2023-12-12
Payer: COMMERCIAL

## 2023-12-12 DIAGNOSIS — K58.9 IRRITABLE BOWEL SYNDROME, UNSPECIFIED TYPE: ICD-10-CM

## 2023-12-12 DIAGNOSIS — E78.1 HYPERTRIGLYCERIDEMIA: ICD-10-CM

## 2023-12-12 DIAGNOSIS — F41.9 ANXIETY: ICD-10-CM

## 2023-12-12 DIAGNOSIS — Z00.6 ENCOUNTER FOR EXAMINATION FOR NORMAL COMPARISON OR CONTROL IN CLINICAL RESEARCH PROGRAM: ICD-10-CM

## 2023-12-12 DIAGNOSIS — R79.89 LOW VITAMIN D LEVEL: ICD-10-CM

## 2023-12-12 DIAGNOSIS — R77.8 ELEVATED TOTAL PROTEIN: ICD-10-CM

## 2023-12-12 DIAGNOSIS — F32.5 MAJOR DEPRESSIVE DISORDER IN FULL REMISSION, UNSPECIFIED WHETHER RECURRENT (HCC): ICD-10-CM

## 2023-12-12 DIAGNOSIS — E83.52 HYPERCALCEMIA: ICD-10-CM

## 2023-12-12 DIAGNOSIS — R74.8 ELEVATED LIVER ENZYMES: ICD-10-CM

## 2023-12-12 DIAGNOSIS — E66.9 CLASS 1 OBESITY WITH SERIOUS COMORBIDITY AND BODY MASS INDEX (BMI) OF 33.0 TO 33.9 IN ADULT, UNSPECIFIED OBESITY TYPE: ICD-10-CM

## 2023-12-12 DIAGNOSIS — E78.5 HYPERLIPIDEMIA, UNSPECIFIED HYPERLIPIDEMIA TYPE: ICD-10-CM

## 2023-12-12 DIAGNOSIS — Z13.6 SCREENING FOR CARDIOVASCULAR CONDITION: ICD-10-CM

## 2023-12-12 LAB
25(OH)D3 SERPL-MCNC: 23.6 NG/ML (ref 30–100)
ALBUMIN SERPL BCP-MCNC: 4.5 G/DL (ref 3.5–5)
ALP SERPL-CCNC: 41 U/L (ref 34–104)
ALT SERPL W P-5'-P-CCNC: 32 U/L (ref 7–52)
ANION GAP SERPL CALCULATED.3IONS-SCNC: 9 MMOL/L
AST SERPL W P-5'-P-CCNC: 25 U/L (ref 13–39)
BASOPHILS # BLD AUTO: 0.04 THOUSANDS/ÂΜL (ref 0–0.1)
BASOPHILS NFR BLD AUTO: 1 % (ref 0–1)
BILIRUB SERPL-MCNC: 0.66 MG/DL (ref 0.2–1)
BUN SERPL-MCNC: 14 MG/DL (ref 5–25)
CALCIUM SERPL-MCNC: 9.7 MG/DL (ref 8.4–10.2)
CHLORIDE SERPL-SCNC: 103 MMOL/L (ref 96–108)
CHOLEST SERPL-MCNC: 199 MG/DL
CO2 SERPL-SCNC: 30 MMOL/L (ref 21–32)
CREAT SERPL-MCNC: 0.91 MG/DL (ref 0.6–1.3)
EOSINOPHIL # BLD AUTO: 0.16 THOUSAND/ÂΜL (ref 0–0.61)
EOSINOPHIL NFR BLD AUTO: 4 % (ref 0–6)
ERYTHROCYTE [DISTWIDTH] IN BLOOD BY AUTOMATED COUNT: 12 % (ref 11.6–15.1)
GFR SERPL CREATININE-BSD FRML MDRD: 108 ML/MIN/1.73SQ M
GLUCOSE P FAST SERPL-MCNC: 90 MG/DL (ref 65–99)
HCT VFR BLD AUTO: 44.3 % (ref 36.5–49.3)
HDLC SERPL-MCNC: 46 MG/DL
HGB BLD-MCNC: 15.7 G/DL (ref 12–17)
IMM GRANULOCYTES # BLD AUTO: 0.01 THOUSAND/UL (ref 0–0.2)
IMM GRANULOCYTES NFR BLD AUTO: 0 % (ref 0–2)
LDLC SERPL CALC-MCNC: 100 MG/DL (ref 0–100)
LDLC SERPL DIRECT ASSAY-MCNC: 116 MG/DL (ref 0–100)
LYMPHOCYTES # BLD AUTO: 1.51 THOUSANDS/ÂΜL (ref 0.6–4.47)
LYMPHOCYTES NFR BLD AUTO: 33 % (ref 14–44)
MCH RBC QN AUTO: 32.7 PG (ref 26.8–34.3)
MCHC RBC AUTO-ENTMCNC: 35.4 G/DL (ref 31.4–37.4)
MCV RBC AUTO: 92 FL (ref 82–98)
MONOCYTES # BLD AUTO: 0.43 THOUSAND/ÂΜL (ref 0.17–1.22)
MONOCYTES NFR BLD AUTO: 10 % (ref 4–12)
NEUTROPHILS # BLD AUTO: 2.37 THOUSANDS/ÂΜL (ref 1.85–7.62)
NEUTS SEG NFR BLD AUTO: 52 % (ref 43–75)
NONHDLC SERPL-MCNC: 153 MG/DL
NRBC BLD AUTO-RTO: 0 /100 WBCS
PLATELET # BLD AUTO: 252 THOUSANDS/UL (ref 149–390)
PMV BLD AUTO: 10.8 FL (ref 8.9–12.7)
POTASSIUM SERPL-SCNC: 4 MMOL/L (ref 3.5–5.3)
PROT SERPL-MCNC: 7.5 G/DL (ref 6.4–8.4)
RBC # BLD AUTO: 4.8 MILLION/UL (ref 3.88–5.62)
SODIUM SERPL-SCNC: 142 MMOL/L (ref 135–147)
TRIGL SERPL-MCNC: 266 MG/DL
TSH SERPL DL<=0.05 MIU/L-ACNC: 2.13 UIU/ML (ref 0.45–4.5)
WBC # BLD AUTO: 4.52 THOUSAND/UL (ref 4.31–10.16)

## 2023-12-12 PROCEDURE — 80053 COMPREHEN METABOLIC PANEL: CPT

## 2023-12-12 PROCEDURE — 80061 LIPID PANEL: CPT

## 2023-12-12 PROCEDURE — 36415 COLL VENOUS BLD VENIPUNCTURE: CPT

## 2023-12-12 PROCEDURE — 84443 ASSAY THYROID STIM HORMONE: CPT

## 2023-12-12 PROCEDURE — 85025 COMPLETE CBC W/AUTO DIFF WBC: CPT

## 2023-12-12 PROCEDURE — 83721 ASSAY OF BLOOD LIPOPROTEIN: CPT

## 2023-12-12 PROCEDURE — 82306 VITAMIN D 25 HYDROXY: CPT

## 2023-12-12 NOTE — RESULT ENCOUNTER NOTE
Stable labs - will review with patient at upcoming appointment. Hyperlipidemia - Recommend lifestyle modifications. Low vitamin D - Recommend start multivitamin and over-the-counter vitamin D3 1000 - 3000 International Units daily.

## 2023-12-14 NOTE — PROGRESS NOTES
Assessment/Plan:  Problem List Items Addressed This Visit          Digestive    IBS (irritable bowel syndrome)     Improved c dietary changes. Stable s Elavil 25mg QD. Relevant Orders    TSH, 3rd generation with Free T4 reflex    GERD (gastroesophageal reflux disease)     Improved c dietary changes. Watch GERD diet. Other    Depression     Stable s Elavil 25mg QD. Relevant Orders    TSH, 3rd generation with Free T4 reflex    Hypertriglyceridemia     Stable s Rx. Recommend lifestyle modifications. Relevant Orders    CBC and differential    Comprehensive metabolic panel    Lipid panel    LDL cholesterol, direct    Anxiety     Stable s Elavil 25mg QD. Relevant Orders    TSH, 3rd generation with Free T4 reflex    Hyperlipidemia     Stable s statin. Recommend lifestyle modifications. Relevant Orders    CBC and differential    Comprehensive metabolic panel    Lipid panel    LDL cholesterol, direct    Class 2 severe obesity with serious comorbidity and body mass index (BMI) of 35.0 to 35.9 in adult      Worsening. Recommend lifestyle modifications.            Relevant Orders    TSH, 3rd generation with Free T4 reflex    Vitamin D 25 hydroxy     Other Visit Diagnoses       Annual physical exam    -  Primary    Health Maintenance   Topic Date Due   • COVID-19 Vaccine (4 - 2023-24 season) 03/15/2024 (Originally 9/1/2023)   • BMI: Adult  08/25/2024   • BMI: Followup Plan  12/15/2024   • Annual Physical  12/15/2024   • DTaP,Tdap,and Td Vaccines (2 - Td or Tdap) 02/08/2026   • HIV Screening  Completed   • Hepatitis C Screening  Completed   • Influenza Vaccine  Completed   • Pneumococcal Vaccine: Pediatrics (0 to 5 Years) and At-Risk Patients (6 to 59 Years)  Aged Out   • HIB Vaccine  Aged Out   • IPV Vaccine  Aged Out   • Hepatitis A Vaccine  Aged Out   • Meningococcal ACWY Vaccine  Aged Out   • HPV Vaccine  Aged Out         Obesity (BMI 35.0-39.9 without comorbidity) Relevant Orders    TSH, 3rd generation with Free T4 reflex    Vitamin D 25 hydroxy    Severe obesity (BMI 35.0-39. 9) with comorbidity ([address])        Relevant Orders    TSH, 3rd generation with Free T4 reflex    Vitamin D 25 hydroxy    BMI 35.0-35.9,adult        Relevant Orders    TSH, 3rd generation with Free T4 reflex    Vitamin D 25 hydroxy    Screening for diabetes mellitus        Relevant Orders    Hemoglobin A1C    Screening for cardiovascular condition        Relevant Orders    CBC and differential    Comprehensive metabolic panel    Lipid panel    LDL cholesterol, direct    Plantar fasciitis, left        Relevant Orders    Ambulatory Referral to Podiatry    Ambulatory Referral to Physical Therapy    Home Exercise Program given. Acute viral conjunctivitis of left eye        Advise warm compresses. Contact precautions discussed. Handout given. Return in about 1 year (around 12/15/2024) for Physical -  HyperTG, Anx/Dep, IBS, Labs. Future Appointments   Date Time Provider [address]   12/20/2024  9:00 AM Horace Caba DO [facility]          Subjective:     Mary Estrada is a 26 y.o. male who presents today for a follow-up on his chronic medical conditions. HPI:  Chief Complaint   Patient presents with   • Physical Exam     Physical / 6mo - HyperTG, Anx/Dep, IBS, Labs. Labs done. • HM     No additional covid boosters. • Eye Problem     Pt states this morning he woke up and his L eye was puffy and has some redness. Denies discomfort or itching. No recent exposure to pink eye.      -- Above per clinical staff and reviewed. --      HPI      Today:      Return in about 6 months (around 12/15/2023) for Physical / 6mo -  HyperTG, Anx/Dep, IBS, Labs. 6mo OV    Left Eye Swelling and Redness - Started this AM.  Denies pain, itching, D/C, vision changes. No sick contacts. Does not wear contacts. Not using OTC meds.       Left Foot Distal Plantar Fascia Insertion Pain - Symptoms x 6 months, more constant. Dull, burning pain. Improving c stretching. No aggravating symptoms. Obesity - Watching diet. No regular exercise. Has Peloton bike at home. Hyperlipidemia / Hypertriglyceridemia - No meds previously. Depression / Anxiety - Mood is stable. Per patient request, D/C Elavil 25mg 1 pill QHS. No higher doses or other mood meds previously. Good social supports. No SI/HI/AH/VH. No counseling previously. H/O of excessive ETOH use after losing job for 1 year previously - now drinking is controlled. PHQ-2/9 Depression Screening    Little interest or pleasure in doing things: 0 - not at all  Feeling down, depressed, or hopeless: 0 - not at all  Trouble falling or staying asleep, or sleeping too much: 0 - not at all  Feeling tired or having little energy: 0 - not at all  Poor appetite or overeatin - not at all  Feeling bad about yourself - or that you are a failure or have let yourself or your family down: 0 - not at all  Trouble concentrating on things, such as reading the newspaper or watching television: 0 - not at all  Moving or speaking so slowly that other people could have noticed. Or the opposite - being so fidgety or restless that you have been moving around a lot more than usual: 0 - not at all  Thoughts that you would be better off dead, or of hurting yourself in some way: 0 - not at all  PHQ-9 Score: 0   PHQ-9 Interpretation: No or Minimal depression        JAMIE-7 Flowsheet Screening    Flowsheet Row Most Recent Value   Over the last 2 weeks, how often have you been bothered by any of the following problems?     Feeling nervous, anxious, or on edge 0   Not being able to stop or control worrying 0   Worrying too much about different things 0   Trouble relaxing 0   Being so restless that it is hard to sit still 0   Becoming easily annoyed or irritable 0   Feeling afraid as if something awful might happen 0   JAMIE-7 Total Score 0              GERD - Stable. No longer eating red meat, rarely eating milk and dairy. Management per Gladys Conte. Next appt PRN. S/p Normal HIDA scan, U/S, CT negative for gallstones. Seen by Charisse Rodriguez Proper 5/12/22 - next appt PRN. S/p Colonoscopy 5/26/22, EGD 2022 - GERD and erosions per patient. Previously on Omeprazole, Tums. IBS - Stable s Elavil 25mg QHS. Reviewed:  Labs 12/12/23, Gen Surgery 6/27/23    Sees Dentist q6 months. Sees Optho q2 years. The following portions of the patient's history were reviewed and updated as appropriate: allergies, current medications, past family history, past medical history, past social history, past surgical history and problem list.      Review of Systems   Constitutional:  Negative for appetite change, chills, diaphoresis, fatigue and fever. Respiratory:  Negative for chest tightness and shortness of breath. Cardiovascular:  Negative for chest pain. Gastrointestinal:  Negative for abdominal pain, blood in stool, diarrhea, nausea and vomiting. Genitourinary:  Negative for dysuria. Current Outpatient Medications   Medication Sig Dispense Refill   • Multiple Vitamins-Minerals (AIRBORNE PO) Take 1 tablet by mouth in the morning       No current facility-administered medications for this visit. Objective:  /80   Pulse 100   Temp 98.5 °F (36.9 °C)   Resp 16   Ht 5' 8" (1.727 m)   Wt 105 kg (231 lb 6.4 oz)   SpO2 99%   BMI 35.18 kg/m²    Wt Readings from Last 3 Encounters:   12/15/23 105 kg (231 lb 6.4 oz)   08/25/23 103 kg (227 lb)   06/27/23 99.8 kg (220 lb)      BP Readings from Last 3 Encounters:   12/15/23 118/80   11/10/23 147/82   08/25/23 130/80          Physical Exam  Vitals and nursing note reviewed. Constitutional:       Appearance: Normal appearance. He is well-developed. He is obese. HENT:      Head: Normocephalic and atraumatic.       Right Ear: Tympanic membrane, ear canal and external ear normal.      Left Ear: Tympanic membrane, ear canal and external ear normal.      Nose: Nose normal.      Right Sinus: No maxillary sinus tenderness or frontal sinus tenderness. Left Sinus: No maxillary sinus tenderness or frontal sinus tenderness. Mouth/Throat:      Mouth: Mucous membranes are moist.      Pharynx: Uvula midline. Tonsils: No tonsillar exudate. Eyes:      General: Lids are normal.         Right eye: No discharge. Left eye: No discharge. Extraocular Movements: Extraocular movements intact. Conjunctiva/sclera:      Left eye: Left conjunctiva is injected. No exudate or hemorrhage. Pupils: Pupils are equal, round, and reactive to light. Cardiovascular:      Rate and Rhythm: Normal rate and regular rhythm. Pulses: Normal pulses. Dorsalis pedis pulses are 2+ on the right side and 2+ on the left side. Posterior tibial pulses are 2+ on the right side and 2+ on the left side. Heart sounds: Normal heart sounds. Pulmonary:      Effort: Pulmonary effort is normal.      Breath sounds: Normal breath sounds. Abdominal:      General: Bowel sounds are normal. There is no distension. Palpations: Abdomen is soft. There is no mass. Tenderness: There is no abdominal tenderness. There is no guarding or rebound. Musculoskeletal:         General: No swelling or tenderness. Cervical back: Neck supple. Right lower leg: No edema. Left lower leg: No edema. Feet:      Right foot:      Skin integrity: Skin integrity normal.      Left foot:      Skin integrity: Skin integrity normal.      Comments: Left Distal Insertion Plantar Fascia Tender to palpation  Lymphadenopathy:      Cervical: No cervical adenopathy. Skin:     Findings: No rash. Neurological:      General: No focal deficit present. Mental Status: He is alert and oriented to person, place, and time. Mental status is at baseline.    Psychiatric: Mood and Affect: Mood normal.         Behavior: Behavior normal.         Thought Content: Thought content normal.         Judgment: Judgment normal.         Lab Results:      Lab Results   Component Value Date    WBC 4.52 12/12/2023    HGB 15.7 12/12/2023    HCT 44.3 12/12/2023     12/12/2023    TRIG 266 (H) 12/12/2023    HDL 46 12/12/2023    LDLDIRECT 116 (H) 12/12/2023    ALT 32 12/12/2023    AST 25 12/12/2023    K 4.0 12/12/2023     12/12/2023    CREATININE 0.91 12/12/2023    BUN 14 12/12/2023    CO2 30 12/12/2023    GLUF 90 12/12/2023    HGBA1C 4.7 05/19/2023     No results found for: "URICACID"  Invalid input(s): "BASENAME" Vitamin D    No results found. POCT Labs      BMI Counseling: Body mass index is 35.18 kg/m². The BMI is above normal. Exercise recommendations include exercising 3-5 times per week. BMI Counseling: Body mass index is 35.18 kg/m². The BMI is above normal. Nutrition recommendations include 3-5 servings of fruits/vegetables daily. Exercise recommendations include exercising 3-5 times per week.

## 2023-12-15 ENCOUNTER — OFFICE VISIT (OUTPATIENT)
Dept: FAMILY MEDICINE CLINIC | Facility: CLINIC | Age: 33
End: 2023-12-15
Payer: COMMERCIAL

## 2023-12-15 VITALS
BODY MASS INDEX: 35.07 KG/M2 | OXYGEN SATURATION: 99 % | HEIGHT: 68 IN | HEART RATE: 100 BPM | WEIGHT: 231.4 LBS | SYSTOLIC BLOOD PRESSURE: 118 MMHG | TEMPERATURE: 98.5 F | DIASTOLIC BLOOD PRESSURE: 80 MMHG | RESPIRATION RATE: 16 BRPM

## 2023-12-15 DIAGNOSIS — Z13.6 SCREENING FOR CARDIOVASCULAR CONDITION: ICD-10-CM

## 2023-12-15 DIAGNOSIS — E66.9 OBESITY (BMI 35.0-39.9 WITHOUT COMORBIDITY): ICD-10-CM

## 2023-12-15 DIAGNOSIS — K58.9 IRRITABLE BOWEL SYNDROME, UNSPECIFIED TYPE: ICD-10-CM

## 2023-12-15 DIAGNOSIS — M72.2 PLANTAR FASCIITIS, LEFT: ICD-10-CM

## 2023-12-15 DIAGNOSIS — E78.5 HYPERLIPIDEMIA, UNSPECIFIED HYPERLIPIDEMIA TYPE: ICD-10-CM

## 2023-12-15 DIAGNOSIS — E66.01 SEVERE OBESITY (BMI 35.0-39.9) WITH COMORBIDITY (HCC): ICD-10-CM

## 2023-12-15 DIAGNOSIS — F41.9 ANXIETY: ICD-10-CM

## 2023-12-15 DIAGNOSIS — F32.5 MAJOR DEPRESSIVE DISORDER IN FULL REMISSION, UNSPECIFIED WHETHER RECURRENT (HCC): ICD-10-CM

## 2023-12-15 DIAGNOSIS — K21.00 GASTROESOPHAGEAL REFLUX DISEASE WITH ESOPHAGITIS, UNSPECIFIED WHETHER HEMORRHAGE: ICD-10-CM

## 2023-12-15 DIAGNOSIS — E66.01 CLASS 2 SEVERE OBESITY WITH SERIOUS COMORBIDITY AND BODY MASS INDEX (BMI) OF 35.0 TO 35.9 IN ADULT, UNSPECIFIED OBESITY TYPE: ICD-10-CM

## 2023-12-15 DIAGNOSIS — Z00.00 ANNUAL PHYSICAL EXAM: Primary | ICD-10-CM

## 2023-12-15 DIAGNOSIS — E78.1 HYPERTRIGLYCERIDEMIA: ICD-10-CM

## 2023-12-15 DIAGNOSIS — B30.9 ACUTE VIRAL CONJUNCTIVITIS OF LEFT EYE: ICD-10-CM

## 2023-12-15 DIAGNOSIS — Z13.1 SCREENING FOR DIABETES MELLITUS: ICD-10-CM

## 2023-12-15 PROBLEM — E66.812 CLASS 2 SEVERE OBESITY WITH SERIOUS COMORBIDITY AND BODY MASS INDEX (BMI) OF 35.0 TO 35.9 IN ADULT (HCC): Status: ACTIVE | Noted: 2023-06-15

## 2023-12-15 PROCEDURE — 99395 PREV VISIT EST AGE 18-39: CPT | Performed by: FAMILY MEDICINE

## 2023-12-15 PROCEDURE — 99213 OFFICE O/P EST LOW 20 MIN: CPT | Performed by: FAMILY MEDICINE

## 2023-12-15 NOTE — PATIENT INSTRUCTIONS
Low vitamin D - Recommend start multivitamin and over-the-counter vitamin D3 1000 - 3000 International Units daily. Please contact your insurance if you are uncertain of coverage for plan of care items. Your insurance may not cover the cost of your Vitamin D blood test, which is approximately $65-70. Please notify the lab prior to blood draw if you would like to decline this test.        Conjunctivitis   AMBULATORY CARE:   Conjunctivitis , or pink eye, is inflammation of your conjunctiva. The conjunctiva is a thin tissue that covers the front of your eye and the back of your eyelid. The conjunctiva helps protect your eye and keep it moist. The most common cause of conjunctivitis is infection with bacteria or a virus. Allergies or exposure to a chemical may also cause conjunctivitis. Conjunctivitis is easily spread from person to person. Common signs or symptoms: You may have symptoms in one or both eyes. You may also have other general symptoms, including a sore throat, runny nose, or fever. You may have any of the following:  Redness in the whites of your eye    Itching in or around your eye    Feeling like there is something in your eye    Watery or thick, sticky discharge    Crusty eyelids when you wake up in the morning    Burning, stinging, or swelling in your eye    Pain when you see bright light    Seek care immediately if:   You have worsening eye pain. The swelling in your eye gets worse, even after treatment. Your vision suddenly becomes worse, or you cannot see at all. Call your doctor if:   Your start to notice changes in your vision. You develop a fever and ear pain. You have tiny bumps or spots of blood on your eye. You have questions or concerns about your condition or care. Treatment:  Your conjunctivitis may go away on its own. Treatment depends on what is causing your conjunctivitis. You may need any of the following:   Allergy medicine  helps decrease itchy, red, swollen eyes caused by allergies. It may be given as a pill, eye drops, or nasal spray. Antibiotics  may be needed if your conjunctivitis is caused by bacteria. This medicine may be given as a pill, eye drops, or eye ointment. Manage your symptoms:   Apply a cool compress. Wet a washcloth with cold water and place it on your eye. This will help decrease itching and irritation. Use artificial tears. This will help lessen your symptoms, including itching or irritation. Do not wear contact lenses  until treatment is complete and your symptoms are gone. Flush your eye. You may need to flush your eye with saline to help decrease your symptoms. Ask for more information on how to flush your eye. Prevent the spread of conjunctivitis:   Wash your hands with soap and water often. Wash your hands before and after you touch your eyes. Wash your hands after you use the bathroom, change a child's diaper, or sneeze. Wash your hands before you prepare or eat food. Avoid contact with others. Do not share towels or washcloths. Try to stay away from others as much as possible. Ask when you can return to work or school. Avoid allergens and irritants. Try to avoid the things that cause your allergies, such as pets, dust, or grass. Stay away from smoke filled areas. Shield your eyes from wind and sun. Throw away eye makeup. Bacteria can stay in eye makeup. Throw away your current mascara and other eye makeup. Never share mascara or other eye makeup with anyone. Follow up with your doctor as directed: You may be referred to an ophthalmologist for treatment. Write down your questions so you remember to ask them during your visits. © Copyright [facility] 2023 Information is for End User's use only and may not be sold, redistributed or otherwise used for commercial purposes. The above information is an  only.  It is not intended as medical advice for individual conditions or treatments. Talk to your doctor, nurse or pharmacist before following any medical regimen to see if it is safe and effective for you. Obesity   AMBULATORY CARE:   Obesity  means your body mass index (BMI) is greater than 30. Your healthcare provider will use your age, height, and weight to measure your BMI. The risks of obesity include  many health problems, including injuries or physical disability. Diabetes (high blood sugar level)    High blood pressure or high cholesterol    Heart disease or heart failure    Stroke    Gallbladder or liver disease    Cancer of the colon, breast, prostate, liver, or kidney    Sleep apnea    Arthritis or gout    Screening  is done to check for health conditions before you have signs or symptoms. If you are 28to 79years old, your blood sugar level may be checked every 3 years for signs of prediabetes or diabetes. Your healthcare provider will check your blood pressure at each visit. High blood pressure can lead to a stroke or other problems. Your provider may check for signs of heart disease, cancer, or other health problems. Seek care immediately if:   You have a severe headache, confusion, or difficulty speaking. You have weakness on one side of your body. You have chest pain, sweating, or shortness of breath. Call your doctor if:   You have symptoms of gallbladder or liver disease, such as pain in your upper abdomen. You have knee or hip pain and discomfort while walking. You have symptoms of diabetes, such as intense hunger and thirst, and frequent urination. You have symptoms of sleep apnea, such as snoring or daytime sleepiness. You have questions or concerns about your condition or care. Treatment for obesity  focuses on helping you lose weight to improve your health. Even a small decrease in BMI can reduce the risk for many health problems.  Your healthcare provider will help you set a weight-loss goal.  Lifestyle changes  are the first step in treating obesity. These include making healthy food choices and getting regular physical activity. Your healthcare provider may suggest a weight-loss program that involves coaching, education, and therapy. Medicine  may help you lose weight when it is used with a healthy foods and physical activity. Surgery  can help you lose weight if you have obesity along with other health problems. Several types of weight-loss surgery are available. Ask your healthcare provider for more information. Tips for safe weight loss:   Set small, realistic goals. An example of a small goal is to walk for 20 minutes 5 days a week. Anther goal is to lose 5% of your body weight. Ask for support. Tell friends, family members, and coworkers about your goals. Ask someone to lose weight with you. You may also want to join a weight-loss support group. Identify foods or triggers that may cause you to overeat. Remove tempting high-calorie foods from your home and workplace. Place a bowl of fresh fruit on your kitchen counter. If stress causes you to eat, find other ways to cope with stress. A counselor or therapist may be able to help you. Track your daily calories and activity. Write down what you eat and drink. Also write down how many minutes of physical activity you do each day. Track your weekly weight. Weigh yourself in the morning, before you eat or drink anything but after you use the bathroom. Use the same scale, in the same place, and in similar clothing each time. Only weigh yourself 1 to 2 times each week, or as directed. You may become discouraged if you weigh yourself every day. Eating changes: You will need to eat 500 to 1,000 fewer calories each day than you currently eat to lose 1 to 2 pounds a week. The following changes will help you cut calories:  Eat smaller portions. Use small plates, no larger than 9 inches in diameter. Fill your plate half full of fruits and vegetables.  Measure your food using measuring cups until you know what a serving size looks like. Eat 3 meals and 1 or 2 snacks each day. Plan your meals in advance. [place] [place] and eat at home most of the time. Eat slowly. Do not skip meals. Skipping meals can lead to overeating later in the day. This can make it harder for you to lose weight. Talk with a dietitian to help you make a meal plan and schedule that is right for you. Eat fruits and vegetables at every meal.  They are low in calories and high in fiber, which makes you feel full. Do not add butter, margarine, or cream sauce to vegetables. Use herbs to season steamed vegetables. Eat less fat and fewer fried foods. Eat more baked or grilled chicken and fish. These protein sources are lower in calories and fat than red meat. Limit fast food. Dress your salads with olive oil and vinegar instead of bottled dressing. Limit the amount of sugar you eat. Do not drink sugary beverages. Limit alcohol. Activity changes:  Physical activity is good for your body in many ways. It helps you burn calories and build strong muscles. It decreases stress and depression, and improves your mood. It can also help you sleep better. Talk to your healthcare provider before you begin an exercise program.  Exercise for at least 30 minutes 5 days a week. Start slowly. Set aside time each day for physical activity that you enjoy and that is convenient for you. It is best to do both weight training and an activity that increases your heart rate, such as walking, bicycling, or swimming. Find ways to be more active. Do yard work and housecleaning. Walk up the stairs instead of using elevators. Spend your leisure time going to events that require walking, such as outdoor festivals or fairs. This extra physical activity can help you lose weight and keep it off. Follow up with your doctor as directed: You may need to meet with a dietitian.  Write down your questions so you remember to ask them during your visits. © Copyright [facility] 2023 Information is for End User's use only and may not be sold, redistributed or otherwise used for commercial purposes. The above information is an  only. It is not intended as medical advice for individual conditions or treatments. Talk to your doctor, nurse or pharmacist before following any medical regimen to see if it is safe and effective for you. Wellness Visit for Adults   AMBULATORY CARE:   A wellness visit  is when you see your healthcare provider to get screened for health problems. Your healthcare provider will also give you advice on how to stay healthy. Write down your questions so you remember to ask them. Ask your healthcare provider how often you should have a wellness visit. What happens at a wellness visit:  Your healthcare provider will ask about your health, and your family history of health problems. This includes high blood pressure, heart disease, and cancer. He or she will ask if you have symptoms that concern you, if you smoke, and about your mood. You may also be asked about your intake of medicines, supplements, food, and alcohol. Any of the following may be done: Your weight  will be checked. Your height may also be checked so your body mass index (BMI) can be calculated. Your BMI shows if you are at a healthy weight. Your blood pressure  and heart rate will be checked. Your temperature may also be checked. Blood and urine tests  may be done. Blood tests may be done to check your cholesterol levels. Abnormal cholesterol levels increase your risk for heart disease and stroke. You may also need a blood or urine test to check for diabetes if you are at increased risk. Urine tests may be done to look for signs of an infection or kidney disease. A physical exam  includes checking your heartbeat and lungs with a stethoscope. Your healthcare provider may also check your skin to look for sun damage.     Screening tests  may be recommended. A screening test is done to check for diseases that may not cause symptoms. The screening tests you may need depend on your age, gender, family history, and lifestyle habits. For example, colorectal screening may be recommended if you are 48years old or older. Screening tests you need if you are a woman:   A Pap smear  is used to screen for cervical cancer. Pap smears are usually done every 3 to 5 years depending on your age. You may need them more often if you have had abnormal Pap smear test results in the past. Ask your healthcare provider how often you should have a Pap smear. A mammogram  is an x-ray of your breasts to screen for breast cancer. Experts recommend mammograms every 2 years starting at age 48 years. You may need a mammogram at age 52 years or younger if you have an increased risk for breast cancer. Talk to your healthcare provider about when you should start having mammograms and how often you need them. Vaccines you may need:   Get an influenza vaccine  every year. The influenza vaccine protects you from the flu. Several types of viruses cause the flu. The viruses change over time, so new vaccines are made each year. Get a tetanus-diphtheria (Td) booster vaccine  every 10 years. This vaccine protects you against tetanus and diphtheria. Tetanus is a severe infection that may cause painful muscle spasms and lockjaw. Diphtheria is a severe bacterial infection that causes a thick covering in the back of your mouth and throat. Get a human papillomavirus (HPV) vaccine  if you are female and aged 23 to 32 or male 23 to 24 and never received it. This vaccine protects you from HPV infection. HPV is the most common infection spread by sexual contact. HPV may also cause vaginal, penile, and anal cancers. Get a pneumococcal vaccine  if you are aged 72 years or older. The pneumococcal vaccine is an injection given to protect you from pneumococcal disease. Pneumococcal disease is an infection caused by pneumococcal bacteria. The infection may cause pneumonia, meningitis, or an ear infection. Get a shingles vaccine  if you are 60 or older, even if you have had shingles before. The shingles vaccine is an injection to protect you from the varicella-zoster virus. This is the same virus that causes chickenpox. Shingles is a painful rash that develops in people who had chickenpox or have been exposed to the virus. How to eat healthy:  My Plate is a model for planning healthy meals. It shows the types and amounts of foods that should go on your plate. Fruits and vegetables make up about half of your plate, and grains and protein make up the other half. A serving of dairy is included on the side of your plate. The amount of calories and serving sizes you need depends on your age, gender, weight, and height. Examples of healthy foods are listed below:  Eat a variety of vegetables  such as dark green, red, and orange vegetables. You can also include canned vegetables low in sodium (salt) and frozen vegetables without added butter or sauces. Eat a variety of fresh fruits , canned fruit in 100% juice, frozen fruit, and dried fruit. Include whole grains. At least half of the grains you eat should be whole grains. Examples include whole-wheat bread, wheat pasta, brown rice, and whole-grain cereals such as oatmeal.    Eat a variety of protein foods such as seafood (fish and shellfish), lean meat, and poultry without skin (turkey and chicken). Examples of lean meats include pork leg, shoulder, or tenderloin, and beef round, sirloin, tenderloin, and extra lean ground beef. Other protein foods include eggs and egg substitutes, beans, peas, soy products, nuts, and seeds. Choose low-fat dairy products such as skim or 1% milk or low-fat yogurt, cheese, and cottage cheese. Limit unhealthy fats  such as butter, hard margarine, and shortening.        Exercise:  Exercise at least 30 minutes per day on most days of the week. Some examples of exercise include walking, biking, dancing, and swimming. You can also fit in more physical activity by taking the stairs instead of the elevator or parking farther away from stores. Include muscle strengthening activities 2 days each week. Regular exercise provides many health benefits. It helps you manage your weight, and decreases your risk for type 2 diabetes, heart disease, stroke, and high blood pressure. Exercise can also help improve your mood. Ask your healthcare provider about the best exercise plan for you. General health and safety guidelines:   Do not smoke. Nicotine and other chemicals in cigarettes and cigars can cause lung damage. Ask your healthcare provider for information if you currently smoke and need help to quit. E-cigarettes or smokeless tobacco still contain nicotine. Talk to your healthcare provider before you use these products. Limit alcohol. A drink of alcohol is 12 ounces of beer, 5 ounces of wine, or 1½ ounces of liquor. Lose weight, if needed. Being overweight increases your risk of certain health conditions. These include heart disease, high blood pressure, type 2 diabetes, and certain types of cancer. Protect your skin. Do not sunbathe or use tanning beds. Use sunscreen with a SPF 15 or higher. Apply sunscreen at least 15 minutes before you go outside. Reapply sunscreen every 2 hours. Wear protective clothing, hats, and sunglasses when you are outside. Drive safely. Always wear your seatbelt. Make sure everyone in your car wears a seatbelt. A seatbelt can save your life if you are in an accident. Do not use your cell phone when you are driving. This could distract you and cause an accident. Pull over if you need to make a call or send a text message. Practice safe sex. Use latex condoms if are sexually active and have more than one partner.  Your healthcare provider may recommend screening tests for sexually transmitted infections (STIs). Wear helmets, lifejackets, and protective gear. Always wear a helmet when you ride a bike or motorcycle, go skiing, or play sports that could cause a head injury. Wear protective equipment when you play sports. Wear a lifejacket when you are on a boat or doing water sports. © Copyright Lisa Sifuentes 2023 Information is for End User's use only and may not be sold, redistributed or otherwise used for commercial purposes. The above information is an  only. It is not intended as medical advice for individual conditions or treatments. Talk to your doctor, nurse or pharmacist before following any medical regimen to see if it is safe and effective for you. Obesity   AMBULATORY CARE:   Obesity  means your body mass index (BMI) is greater than 30. Your healthcare provider will use your age, height, and weight to measure your BMI. The risks of obesity include  many health problems, including injuries or physical disability. Diabetes (high blood sugar level)    High blood pressure or high cholesterol    Heart disease or heart failure    Stroke    Gallbladder or liver disease    Cancer of the colon, breast, prostate, liver, or kidney    Sleep apnea    Arthritis or gout    Screening  is done to check for health conditions before you have signs or symptoms. If you are 28to 79years old, your blood sugar level may be checked every 3 years for signs of prediabetes or diabetes. Your healthcare provider will check your blood pressure at each visit. High blood pressure can lead to a stroke or other problems. Your provider may check for signs of heart disease, cancer, or other health problems. Seek care immediately if:   You have a severe headache, confusion, or difficulty speaking. You have weakness on one side of your body. You have chest pain, sweating, or shortness of breath.     Call your doctor if:   You have symptoms of gallbladder or liver disease, such as pain in your upper abdomen. You have knee or hip pain and discomfort while walking. You have symptoms of diabetes, such as intense hunger and thirst, and frequent urination. You have symptoms of sleep apnea, such as snoring or daytime sleepiness. You have questions or concerns about your condition or care. Treatment for obesity  focuses on helping you lose weight to improve your health. Even a small decrease in BMI can reduce the risk for many health problems. Your healthcare provider will help you set a weight-loss goal.  Lifestyle changes  are the first step in treating obesity. These include making healthy food choices and getting regular physical activity. Your healthcare provider may suggest a weight-loss program that involves coaching, education, and therapy. Medicine  may help you lose weight when it is used with a healthy foods and physical activity. Surgery  can help you lose weight if you have obesity along with other health problems. Several types of weight-loss surgery are available. Ask your healthcare provider for more information. Tips for safe weight loss:   Set small, realistic goals. An example of a small goal is to walk for 20 minutes 5 days a week. Anther goal is to lose 5% of your body weight. Ask for support. Tell friends, family members, and coworkers about your goals. Ask someone to lose weight with you. You may also want to join a weight-loss support group. Identify foods or triggers that may cause you to overeat. Remove tempting high-calorie foods from your home and workplace. Place a bowl of fresh fruit on your kitchen counter. If stress causes you to eat, find other ways to cope with stress. A counselor or therapist may be able to help you. Track your daily calories and activity. Write down what you eat and drink. Also write down how many minutes of physical activity you do each day. Track your weekly weight.   Weigh yourself in the morning, before you eat or drink anything but after you use the bathroom. Use the same scale, in the same place, and in similar clothing each time. Only weigh yourself 1 to 2 times each week, or as directed. You may become discouraged if you weigh yourself every day. Eating changes: You will need to eat 500 to 1,000 fewer calories each day than you currently eat to lose 1 to 2 pounds a week. The following changes will help you cut calories:  Eat smaller portions. Use small plates, no larger than 9 inches in diameter. Fill your plate half full of fruits and vegetables. Measure your food using measuring cups until you know what a serving size looks like. Eat 3 meals and 1 or 2 snacks each day. Plan your meals in advance. [place] and eat at home most of the time. Eat slowly. Do not skip meals. Skipping meals can lead to overeating later in the day. This can make it harder for you to lose weight. Talk with a dietitian to help you make a meal plan and schedule that is right for you. Eat fruits and vegetables at every meal.  They are low in calories and high in fiber, which makes you feel full. Do not add butter, margarine, or cream sauce to vegetables. Use herbs to season steamed vegetables. Eat less fat and fewer fried foods. Eat more baked or grilled chicken and fish. These protein sources are lower in calories and fat than red meat. Limit fast food. Dress your salads with olive oil and vinegar instead of bottled dressing. Limit the amount of sugar you eat. Do not drink sugary beverages. Limit alcohol. Activity changes:  Physical activity is good for your body in many ways. It helps you burn calories and build strong muscles. It decreases stress and depression, and improves your mood. It can also help you sleep better. Talk to your healthcare provider before you begin an exercise program.  Exercise for at least 30 minutes 5 days a week. Start slowly.  Set aside time each day for physical activity that you enjoy and that is convenient for you. It is best to do both weight training and an activity that increases your heart rate, such as walking, bicycling, or swimming. Find ways to be more active. Do yard work and housecleaning. Walk up the stairs instead of using elevators. Spend your leisure time going to events that require walking, such as outdoor festivals or fairs. This extra physical activity can help you lose weight and keep it off. Follow up with your doctor as directed: You may need to meet with a dietitian. Write down your questions so you remember to ask them during your visits. © Copyright [employer] 2023 Information is for End User's use only and may not be sold, redistributed or otherwise used for commercial purposes. The above information is an  only. It is not intended as medical advice for individual conditions or treatments. Talk to your doctor, nurse or pharmacist before following any medical regimen to see if it is safe and effective for you. Cholesterol and Your Health   AMBULATORY CARE:   Cholesterol  is a waxy, fat-like substance. Your body uses cholesterol to make hormones and new cells, and to protect nerves. Cholesterol is made by your body. It also comes from certain foods you eat, such as meat and dairy products. Your healthcare provider can help you set goals for your cholesterol levels. Your provider can help you create a plan to meet your goals. Cholesterol level goals: Your cholesterol level goals depend on your risk for heart disease, your age, and your other health conditions. The following are general guidelines: Total cholesterol  includes low-density lipoprotein (LDL), high-density lipoprotein (HDL), and triglyceride levels. The total cholesterol level should be lower than 200 mg/dL and is best at about 150 mg/dL. LDL cholesterol  is called bad cholesterol  because it forms plaque in your arteries.  As plaque builds up, your arteries become narrow, and less blood flows through. When plaque decreases blood flow to your heart, you may have chest pain. If plaque completely blocks an artery that brings blood to your heart, you may have a heart attack. Plaque can break off and form blood clots. Blood clots may block arteries in your brain and cause a stroke. The level should be less than 130 mg/dL and is best at about 100 mg/dL. HDL cholesterol  is called good cholesterol  because it helps remove LDL cholesterol from your arteries. It does this by attaching to LDL cholesterol and carrying it to your liver. Your liver breaks down LDL cholesterol so your body can get rid of it. High levels of HDL cholesterol can help prevent a heart attack and stroke. Low levels of HDL cholesterol can increase your risk for heart disease, heart attack, and stroke. The level should be at least 40 mg/dL in males or at least 50 mg/dL in females. Triglycerides  are a type of fat that store energy from foods you eat. High levels of triglycerides also cause plaque buildup. This can increase your risk for a heart attack or stroke. If your triglyceride level is high, your LDL cholesterol level may also be high. The level should be less than 150 mg/dL. Any of the following can increase your risk for high cholesterol:   Smoking or drinking large amounts of alcohol    Having overweight or obesity, or not getting enough exercise    A medical condition such as hypertension (high blood pressure) or diabetes    A family history of high cholesterol    Age older than 72    What you need to know about having your cholesterol levels checked: Adults 21to 39years of age should have their cholesterol levels checked every 4 to 6 years. Adults 45 years or older should have their cholesterol checked every 1 to 2 years. You may need your cholesterol checked more often, or at a younger age, if you have risk factors for heart disease.  You may also need to have your cholesterol checked more often if you have other health conditions, such as diabetes. Blood tests are used to check cholesterol levels. Blood tests measure your levels of triglycerides, LDL cholesterol, and HDL cholesterol. How healthy fats affect your cholesterol levels:  Healthy fats, also called unsaturated fats, help lower LDL cholesterol and triglyceride levels. Healthy fats include the following:  Monounsaturated fats  are found in foods such as olive oil, canola oil, avocado, nuts, and olives. Polyunsaturated fats,  such as omega 3 fats, are found in fish, such as salmon, trout, and tuna. They can also be found in plant foods such as flaxseed, walnuts, and soybeans. How unhealthy fats affect your cholesterol levels:  Unhealthy fats increase LDL cholesterol and triglyceride levels. They are found in foods high in cholesterol, saturated fat, and trans fat:  Cholesterol  is found in eggs, dairy, and meat. Saturated fat  is found in butter, cheese, ice cream, whole milk, and coconut oil. Saturated fat is also found in meat, such as sausage, hot dogs, and bologna. Trans fat  is found in liquid oils and is used in fried and baked foods. Foods that contain trans fats include chips, crackers, muffins, sweet rolls, microwave popcorn, and cookies. Treatment  for high cholesterol will also decrease your risk of heart disease, heart attack, and stroke. Treatment may include any of the following:  Lifestyle changes  may include food, exercise, weight loss, and quitting smoking. You may also need to decrease the amount of alcohol you drink. Your healthcare provider will want you to start with lifestyle changes. Other treatment may be added if lifestyle changes are not enough. Your healthcare provider may recommend you work with a team to manage hyperlipidemia.  The team may include medical experts such as a dietitian, an exercise or physical therapist, and a behavior therapist. Your family members may be included in helping you create lifestyle changes. Medicines  may be given to lower your LDL cholesterol, triglyceride levels, or total cholesterol level. You may need medicines to lower your cholesterol if any of the following is true:    You have a history of stroke, TIA, unstable angina, or a heart attack. Your LDL cholesterol level is 190 mg/dL or higher. You are age 36 to 76 years, have diabetes or heart disease risk factors, and your LDL cholesterol is 70 mg/dL or higher. Supplements  include fish oil, red yeast rice, and garlic. Fish oil may help lower your triglyceride and LDL cholesterol levels. It may also increase your HDL cholesterol level. Red yeast rice may help decrease your total cholesterol level and LDL cholesterol level. Garlic may help lower your total cholesterol level. Do not take any supplements without talking to your healthcare provider. Food changes you can make to lower your cholesterol levels:  A dietitian can help you create a healthy eating plan. Your dietitian can show you how to read food labels and choose foods low in saturated fat, trans fats, and cholesterol. Decrease the total amount of fat you eat. Choose lean meats, fat-free or 1% fat milk, and low-fat dairy products, such as yogurt and cheese. Try to limit or avoid red meats. Limit or do not eat fried foods or baked goods, such as cookies. Replace unhealthy fats with healthy fats. Cook foods in olive oil or canola oil. Choose soft margarines that are low in saturated fat and trans fat. Seeds, nuts, and avocados are other examples of healthy fats. Eat foods with omega-3 fats. Examples include salmon, tuna, mackerel, walnuts, and flaxseed. Eat fish 2 times per week. Pregnant women should not eat fish that have high levels of mercury, such as shark, swordfish, and capri mackerel. Increase the amount of high-fiber foods you eat. High-fiber foods can help lower your LDL cholesterol.  Aim to get between 20 and 30 grams of fiber each day. Fruits and vegetables are high in fiber. Eat at least 5 servings each day. Other high-fiber foods are whole-grain or whole-wheat breads, pastas, or cereals, and brown rice. Eat 3 ounces of whole-grain foods each day. Increase fiber slowly. You may have abdominal discomfort, bloating, and gas if you add fiber to your diet too quickly. Eat healthy protein foods. Examples include low-fat dairy products, skinless chicken and turkey, fish, and nuts. Limit foods and drinks that are high in sugar. Your dietitian or healthcare provider can help you create daily limits for high-sugar foods and drinks. The limit may be lower if you have diabetes or another health condition. Limits can also help you lose weight if needed. Lifestyle changes you can make to lower your cholesterol levels:   Maintain a healthy weight. Ask your healthcare provider what a healthy weight is for you. Ask your provider to help you create a weight loss plan if needed. Weight loss can decrease your total cholesterol and triglyceride levels. Weight loss may also help keep your blood pressure at a healthy level. Be physically active throughout the day. Physical activity, such as exercise, can help lower your total cholesterol level and maintain a healthy weight. Physical activity can also help increase your HDL cholesterol level. Work with your healthcare provider to create an program that is right for you. Get at least 30 to 40 minutes of moderate physical activity most days of the week. Examples of exercise include brisk walking, swimming, or biking. Also include strength training at least 2 times each week. Your healthcare providers can help you create a physical activity plan. Do not smoke. Nicotine and other chemicals in cigarettes and cigars can raise your cholesterol levels. Ask your healthcare provider for information if you currently smoke and need help to quit.  E-cigarettes or smokeless tobacco still contain nicotine. Talk to your healthcare provider before you use these products. Limit or do not drink alcohol. Alcohol can increase your triglyceride levels. Ask your healthcare provider before you drink alcohol. Ask how much is okay for you to drink in 24 hours or 1 week. Follow up with your doctor as directed:  Write down your questions so you remember to ask them during your visits. © Copyright [employer] 2023 Information is for End User's use only and may not be sold, redistributed or otherwise used for commercial purposes. The above information is an  only. It is not intended as medical advice for individual conditions or treatments. Talk to your doctor, nurse or pharmacist before following any medical regimen to see if it is safe and effective for you.

## 2024-01-12 ENCOUNTER — APPOINTMENT (OUTPATIENT)
Dept: LAB | Facility: CLINIC | Age: 34
End: 2024-01-12
Payer: COMMERCIAL

## 2024-01-12 DIAGNOSIS — Z30.2 ENCOUNTER FOR VASECTOMY: ICD-10-CM

## 2024-01-12 LAB
DEPRECATED CD4 CELLS/CD8 CELLS BLD: 1 ML
SPERM MOTILE SMN QL MICRO: NORMAL

## 2024-01-12 PROCEDURE — 89321 SEMEN ANAL SPERM DETECTION: CPT

## 2024-05-20 ENCOUNTER — APPOINTMENT (OUTPATIENT)
Dept: URGENT CARE | Age: 34
End: 2024-05-20

## 2024-08-30 ENCOUNTER — APPOINTMENT (OUTPATIENT)
Dept: LAB | Facility: CLINIC | Age: 34
End: 2024-08-30

## 2024-08-30 ENCOUNTER — TRANSCRIBE ORDERS (OUTPATIENT)
Dept: LAB | Facility: CLINIC | Age: 34
End: 2024-08-30

## 2024-08-30 DIAGNOSIS — Z00.8 HEALTH EXAMINATION IN POPULATION SURVEY: ICD-10-CM

## 2024-08-30 DIAGNOSIS — Z00.8 HEALTH EXAMINATION IN POPULATION SURVEY: Primary | ICD-10-CM

## 2024-08-30 LAB
CHOLEST SERPL-MCNC: 201 MG/DL
EST. AVERAGE GLUCOSE BLD GHB EST-MCNC: 97 MG/DL
HBA1C MFR BLD: 5 %
HDLC SERPL-MCNC: 39 MG/DL
LDLC SERPL CALC-MCNC: 134 MG/DL (ref 0–100)
NONHDLC SERPL-MCNC: 162 MG/DL
TRIGL SERPL-MCNC: 139 MG/DL

## 2024-08-30 PROCEDURE — 80061 LIPID PANEL: CPT

## 2024-08-30 PROCEDURE — 83036 HEMOGLOBIN GLYCOSYLATED A1C: CPT

## 2024-09-09 LAB
APOB+LDLR+PCSK9 GENE MUT ANL BLD/T: NOT DETECTED
BRCA1+BRCA2 DEL+DUP + FULL MUT ANL BLD/T: NOT DETECTED
MLH1+MSH2+MSH6+PMS2 GN DEL+DUP+FUL M: NOT DETECTED

## 2024-12-18 ENCOUNTER — APPOINTMENT (OUTPATIENT)
Dept: LAB | Facility: CLINIC | Age: 34
End: 2024-12-18
Payer: COMMERCIAL

## 2024-12-18 DIAGNOSIS — K58.9 IRRITABLE BOWEL SYNDROME, UNSPECIFIED TYPE: ICD-10-CM

## 2024-12-18 DIAGNOSIS — E78.5 HYPERLIPIDEMIA, UNSPECIFIED HYPERLIPIDEMIA TYPE: ICD-10-CM

## 2024-12-18 DIAGNOSIS — F41.9 ANXIETY: ICD-10-CM

## 2024-12-18 DIAGNOSIS — F32.5 MAJOR DEPRESSIVE DISORDER IN FULL REMISSION, UNSPECIFIED WHETHER RECURRENT (HCC): ICD-10-CM

## 2024-12-18 DIAGNOSIS — E66.01 CLASS 2 SEVERE OBESITY WITH SERIOUS COMORBIDITY AND BODY MASS INDEX (BMI) OF 35.0 TO 35.9 IN ADULT, UNSPECIFIED OBESITY TYPE (HCC): ICD-10-CM

## 2024-12-18 DIAGNOSIS — Z13.6 SCREENING FOR CARDIOVASCULAR CONDITION: ICD-10-CM

## 2024-12-18 DIAGNOSIS — Z13.1 SCREENING FOR DIABETES MELLITUS: ICD-10-CM

## 2024-12-18 DIAGNOSIS — E78.1 HYPERTRIGLYCERIDEMIA: ICD-10-CM

## 2024-12-18 DIAGNOSIS — E66.812 CLASS 2 SEVERE OBESITY WITH SERIOUS COMORBIDITY AND BODY MASS INDEX (BMI) OF 35.0 TO 35.9 IN ADULT, UNSPECIFIED OBESITY TYPE (HCC): ICD-10-CM

## 2024-12-18 DIAGNOSIS — E66.01 SEVERE OBESITY (BMI 35.0-39.9) WITH COMORBIDITY (HCC): ICD-10-CM

## 2024-12-18 DIAGNOSIS — E66.9 OBESITY (BMI 35.0-39.9 WITHOUT COMORBIDITY): ICD-10-CM

## 2024-12-18 LAB
25(OH)D3 SERPL-MCNC: 33.6 NG/ML (ref 30–100)
ALBUMIN SERPL BCG-MCNC: 4.8 G/DL (ref 3.5–5)
ALP SERPL-CCNC: 43 U/L (ref 34–104)
ALT SERPL W P-5'-P-CCNC: 31 U/L (ref 7–52)
ANION GAP SERPL CALCULATED.3IONS-SCNC: 11 MMOL/L (ref 4–13)
AST SERPL W P-5'-P-CCNC: 26 U/L (ref 13–39)
BASOPHILS # BLD AUTO: 0.03 THOUSANDS/ÂΜL (ref 0–0.1)
BASOPHILS NFR BLD AUTO: 1 % (ref 0–1)
BILIRUB SERPL-MCNC: 0.68 MG/DL (ref 0.2–1)
BUN SERPL-MCNC: 16 MG/DL (ref 5–25)
CALCIUM SERPL-MCNC: 10.2 MG/DL (ref 8.4–10.2)
CHLORIDE SERPL-SCNC: 101 MMOL/L (ref 96–108)
CHOLEST SERPL-MCNC: 234 MG/DL (ref ?–200)
CO2 SERPL-SCNC: 28 MMOL/L (ref 21–32)
CREAT SERPL-MCNC: 0.8 MG/DL (ref 0.6–1.3)
EOSINOPHIL # BLD AUTO: 0.22 THOUSAND/ÂΜL (ref 0–0.61)
EOSINOPHIL NFR BLD AUTO: 5 % (ref 0–6)
ERYTHROCYTE [DISTWIDTH] IN BLOOD BY AUTOMATED COUNT: 11.9 % (ref 11.6–15.1)
EST. AVERAGE GLUCOSE BLD GHB EST-MCNC: 97 MG/DL
GFR SERPL CREATININE-BSD FRML MDRD: 114 ML/MIN/1.73SQ M
GLUCOSE P FAST SERPL-MCNC: 86 MG/DL (ref 65–99)
HBA1C MFR BLD: 5 %
HCT VFR BLD AUTO: 47.6 % (ref 36.5–49.3)
HDLC SERPL-MCNC: 48 MG/DL
HGB BLD-MCNC: 16 G/DL (ref 12–17)
IMM GRANULOCYTES # BLD AUTO: 0.01 THOUSAND/UL (ref 0–0.2)
IMM GRANULOCYTES NFR BLD AUTO: 0 % (ref 0–2)
LDLC SERPL CALC-MCNC: 144 MG/DL (ref 0–100)
LDLC SERPL DIRECT ASSAY-MCNC: 168 MG/DL (ref 0–100)
LYMPHOCYTES # BLD AUTO: 1.62 THOUSANDS/ÂΜL (ref 0.6–4.47)
LYMPHOCYTES NFR BLD AUTO: 36 % (ref 14–44)
MCH RBC QN AUTO: 31.2 PG (ref 26.8–34.3)
MCHC RBC AUTO-ENTMCNC: 33.6 G/DL (ref 31.4–37.4)
MCV RBC AUTO: 93 FL (ref 82–98)
MONOCYTES # BLD AUTO: 0.45 THOUSAND/ÂΜL (ref 0.17–1.22)
MONOCYTES NFR BLD AUTO: 10 % (ref 4–12)
NEUTROPHILS # BLD AUTO: 2.21 THOUSANDS/ÂΜL (ref 1.85–7.62)
NEUTS SEG NFR BLD AUTO: 48 % (ref 43–75)
NONHDLC SERPL-MCNC: 186 MG/DL
NRBC BLD AUTO-RTO: 0 /100 WBCS
PLATELET # BLD AUTO: 257 THOUSANDS/UL (ref 149–390)
PMV BLD AUTO: 10.4 FL (ref 8.9–12.7)
POTASSIUM SERPL-SCNC: 4 MMOL/L (ref 3.5–5.3)
PROT SERPL-MCNC: 8.1 G/DL (ref 6.4–8.4)
RBC # BLD AUTO: 5.13 MILLION/UL (ref 3.88–5.62)
SODIUM SERPL-SCNC: 140 MMOL/L (ref 135–147)
TRIGL SERPL-MCNC: 211 MG/DL (ref ?–150)
TSH SERPL DL<=0.05 MIU/L-ACNC: 2.56 UIU/ML (ref 0.45–4.5)
WBC # BLD AUTO: 4.54 THOUSAND/UL (ref 4.31–10.16)

## 2024-12-18 PROCEDURE — 80053 COMPREHEN METABOLIC PANEL: CPT

## 2024-12-18 PROCEDURE — 80061 LIPID PANEL: CPT

## 2024-12-18 PROCEDURE — 82306 VITAMIN D 25 HYDROXY: CPT

## 2024-12-18 PROCEDURE — 83721 ASSAY OF BLOOD LIPOPROTEIN: CPT

## 2024-12-18 PROCEDURE — 83036 HEMOGLOBIN GLYCOSYLATED A1C: CPT

## 2024-12-18 PROCEDURE — 85025 COMPLETE CBC W/AUTO DIFF WBC: CPT

## 2024-12-18 PROCEDURE — 36415 COLL VENOUS BLD VENIPUNCTURE: CPT

## 2024-12-18 PROCEDURE — 84443 ASSAY THYROID STIM HORMONE: CPT

## 2024-12-20 ENCOUNTER — OFFICE VISIT (OUTPATIENT)
Dept: FAMILY MEDICINE CLINIC | Facility: CLINIC | Age: 34
End: 2024-12-20
Payer: COMMERCIAL

## 2024-12-20 VITALS
RESPIRATION RATE: 16 BRPM | OXYGEN SATURATION: 98 % | WEIGHT: 233 LBS | DIASTOLIC BLOOD PRESSURE: 78 MMHG | HEIGHT: 68 IN | BODY MASS INDEX: 35.31 KG/M2 | TEMPERATURE: 96.6 F | SYSTOLIC BLOOD PRESSURE: 112 MMHG | HEART RATE: 83 BPM

## 2024-12-20 DIAGNOSIS — K21.00 GASTROESOPHAGEAL REFLUX DISEASE WITH ESOPHAGITIS, UNSPECIFIED WHETHER HEMORRHAGE: ICD-10-CM

## 2024-12-20 DIAGNOSIS — E66.01 SEVERE OBESITY (BMI 35.0-39.9) WITH COMORBIDITY (HCC): ICD-10-CM

## 2024-12-20 DIAGNOSIS — E66.01 CLASS 2 SEVERE OBESITY WITH SERIOUS COMORBIDITY AND BODY MASS INDEX (BMI) OF 35.0 TO 35.9 IN ADULT, UNSPECIFIED OBESITY TYPE (HCC): ICD-10-CM

## 2024-12-20 DIAGNOSIS — E78.1 HYPERTRIGLYCERIDEMIA: ICD-10-CM

## 2024-12-20 DIAGNOSIS — K58.9 IRRITABLE BOWEL SYNDROME, UNSPECIFIED TYPE: ICD-10-CM

## 2024-12-20 DIAGNOSIS — F32.5 MAJOR DEPRESSIVE DISORDER IN FULL REMISSION, UNSPECIFIED WHETHER RECURRENT (HCC): ICD-10-CM

## 2024-12-20 DIAGNOSIS — E66.9 OBESITY (BMI 35.0-39.9 WITHOUT COMORBIDITY): ICD-10-CM

## 2024-12-20 DIAGNOSIS — Z00.00 ANNUAL PHYSICAL EXAM: Primary | ICD-10-CM

## 2024-12-20 DIAGNOSIS — F41.9 ANXIETY: ICD-10-CM

## 2024-12-20 DIAGNOSIS — Z13.6 SCREENING FOR CARDIOVASCULAR CONDITION: ICD-10-CM

## 2024-12-20 DIAGNOSIS — E78.5 HYPERLIPIDEMIA, UNSPECIFIED HYPERLIPIDEMIA TYPE: ICD-10-CM

## 2024-12-20 DIAGNOSIS — I83.91 ASYMPTOMATIC VARICOSE VEINS OF RIGHT LOWER EXTREMITY: ICD-10-CM

## 2024-12-20 DIAGNOSIS — Z13.1 SCREENING FOR DIABETES MELLITUS: ICD-10-CM

## 2024-12-20 DIAGNOSIS — E66.812 CLASS 2 SEVERE OBESITY WITH SERIOUS COMORBIDITY AND BODY MASS INDEX (BMI) OF 35.0 TO 35.9 IN ADULT, UNSPECIFIED OBESITY TYPE (HCC): ICD-10-CM

## 2024-12-20 PROCEDURE — 99395 PREV VISIT EST AGE 18-39: CPT | Performed by: FAMILY MEDICINE

## 2024-12-20 NOTE — ASSESSMENT & PLAN NOTE
Stable s Rx.  Recommend lifestyle modifications.      Orders:  •  CBC and differential; Future  •  Comprehensive metabolic panel; Future  •  Lipid panel; Future  •  TSH, 3rd generation with Free T4 reflex; Future  •  LDL cholesterol, direct; Future

## 2024-12-20 NOTE — ASSESSMENT & PLAN NOTE
Improved c dietary changes.  Stable s Elavil 25mg QD.    Orders:  •  TSH, 3rd generation with Free T4 reflex; Future   Intact

## 2024-12-20 NOTE — ASSESSMENT & PLAN NOTE
Stable s statin.  Recommend lifestyle modifications.      Orders:  •  CBC and differential; Future  •  Comprehensive metabolic panel; Future  •  Lipid panel; Future  •  TSH, 3rd generation with Free T4 reflex; Future  •  LDL cholesterol, direct; Future

## 2024-12-20 NOTE — PATIENT INSTRUCTIONS
"    Patient Education     Varicose veins and other vein disease in the legs   The Basics   Written by the doctors and editors at [facility]   What is vein disease? -- Vein disease is a condition that can affect the veins in the legs. It can cause leg pain, varicose veins, swollen legs, or open sores. Varicose veins are swollen and twisted veins.  Vein disease happens when the veins in the legs do not work the right way. Normally, the veins in the legs carry blood from the legs back to the heart. The veins have valves inside them to help keep blood moving in only 1 direction (toward the heart). The valves open to let blood flow to the heart, and close to keep it from flowing back down the leg. Vein disease can happen when the valves are damaged or do not work well. This causes blood to collect in the legs. Blood is especially likely to collect in the legs when a person sits or stands for a long time without walking.  What conditions can cause vein disease? -- Vein disease can be caused by:   A blood clot in a leg vein   Leg injury   Being pregnant, especially more than once - Being pregnant causes hormone changes that can weaken vein walls.   Weight gain  Vein disease can also run in families without any of the above causes.  What are the symptoms of vein disease? -- People with vein disease can have symptoms that include:   Leg pain, or the leg feeling tired or heavy, especially at the end of the day   Swollen veins - \"Spider veins\" are small leg veins that are swollen (picture 1). \"Varicose veins\" are larger leg veins that are swollen and twisted (picture 2).   Swelling in the lower legs or ankles - People can have swelling at the end of the day or all of the time.   Skin color changes - The skin can turn red or red-brown. This is called \"stasis dermatitis.\" Skin color changes often happen first around the ankle (picture 3).   Open sores, also called \"venous leg ulcers\" - These are usually at the ankle and can be " "painful and ooze (picture 4 and picture 5).  Is there a test for vein disease? -- Yes. Your doctor or nurse will do an exam to look at your legs. They might also do a test called an ultrasound. An ultrasound can check how well the valves in the legs work. It can also see if any of the veins in the legs are blocked.  What can I do to reduce my symptoms? -- To reduce swelling and help with blood flow, you can:   Walk around, and try not to sit or  1 place for a long time.   Raise your legs up 3 or 4 times a day, for 30 minutes each time.   Do exercises to point your toes and feet down and up a few times each day.   Avoid crossing your legs when you sit.  To treat dry or itchy skin, you can:   Wash your legs each day with a gentle cleanser. Do not use regular soap, which can make skin more dry.   Use an unscented moisturizing cream or ointment while your skin is still damp. A petroleum-based cream or ointment works well. Ask your doctor or nurse before using any other type of cream or ointment, because some can cause a rash.  If your skin problems are severe, your doctor or nurse might suggest special ointment, medicine, or bandages.  How is vein disease treated? -- Doctors can use different treatments to treat symptoms and reduce swelling. These can include:   Special socks, bandages, or devices:   \"Compression stockings\" are special socks that fit tightly over the ankle and leg. If your doctor or nurse recommends that you wear them, they will tell you which type to wear and how to put them on (figure 1 and table 1).   \"Compression bandages\" are layers of bandages that wrap around a person's leg. They are used when there is a lot of swelling, skin changes, or an open wound.   A \"compression pump\" is a device that fits around the leg and squeezes the leg every few minutes. It is used for people who have problems controlling swelling in other ways.   Special coverings that are put on an open sore to help it " "heal   Medicines - Doctors can use different types of medicines to treat different symptoms. For example, people who cannot use compression stockings or bandages might be able to try medicines that help the veins work better. People with a skin infection might need antibiotics. People with itchy skin might need a prescription cream or ointment.   Procedures - Doctors can do procedures if other treatments do not work. A doctor can remove or destroy damaged veins so they can no longer fill with blood.  All topics are updated as new evidence becomes available and our peer review process is complete.  This topic retrieved from [employer] on: Feb 26, 2024.  Topic 63000 Version 14.0  Release: 32.2.4 - C32.56  © 2024 UpToDate, Inc. and/or its affiliates. All rights reserved.  picture 1: Telangiectasias (spider veins) on the lower leg     This is a picture of telangiectasias (spider veins), which are small veins that are swollen. These are also called \"hyphen webs\" or \"thread veins.\"  Graphic 40619 Version 3.0  picture 2: Severe varicose veins     This is a picture of varicose veins, which are swollen and twisted veins in the legs.  Graphic 98488 Version 2.0  picture 3: Stasis dermatitis     People with vein disease can develop something called \"stasis dermatitis.\" This is when the skin turns red or brown. This often happens first in the ankle area.  Graphic 853984 Version 2.0  picture 4: Venous ulcer     People with vein disease can develop \"venous ulcers.\" These are open sores on the skin.  Graphic 880923 Version 1.0  picture 5: Venous ulcer     People with vein disease can develop \"venous ulcers.\" These are open sores on the skin.  Graphic 769283 Version 1.0  figure 1: Heel-pocket-out method to put on compression stockings     The heel-pocket-out method to put on compression stockings is as follows:  (A) Turn the leg part of the stocking inside-out down to the heel.  (B) Put your foot into the stocking, hold onto the " "folded edge, and pull the stocking onto your foot and over the heel.  (C) Gently work the stocking up your leg by turning it right-side out.  Graphic 66440 Version 8.0  table 1: Tips for using compression stockings  Tips for using compression stockings:   Wash new compression stockings before wearing them to reduce their stiffness and to make them easier to put on.   Put on stockings as early as possible in the morning after you bandage any sores because swelling is less in the morning. If you do not put the stockings on early, raise your legs for 20 to 30 minutes before putting the stockings on.   When putting on stockings, sit on a chair with firm back support (not on the bed).   Knee-high stockings can be put on using the \"heel-pocket-out method\":   Turn the leg part of the stocking inside-out down to the heel.   Put your foot into the stocking, hold onto the folded edge, and pull the stocking onto your foot and over the heel.   Gently work the stocking up your leg by turning it right-side out.   Some people find it helpful to wear rubber gloves to put their stockings on. This can make it easier to slide the stockings up the legs.   Heavy compression stockings may go on more easily if you wear light silk pantyhose under the compression stockings, or if you first put powder on your legs.   Skin moisturizers and treatments that are used to treat open sores can make the stockings dirty and wear them out. Wash the stockings each day after wearing them, if possible. Wash stockings in cold water by hand. You can also wash them with cold water and a small amount of mild detergent in a washing machine. Hang the stockings up to dry, and do not dry them in a machine. Buying at least 2 pairs of stockings at a time will let you wear 1 pair while the other pair dries.   If you have an allergy to rubber (latex), buy compression stockings without elastic.   If you are not able to pull on your stockings, talk with your doctor or " "nurse. There are different stockings you can use or devices that can help you put on stockings.   Graphic 45792 Version 6.0  Consumer Information Use and Disclaimer   Disclaimer: This generalized information is a limited summary of diagnosis, treatment, and/or medication information. It is not meant to be comprehensive and should be used as a tool to help the user understand and/or assess potential diagnostic and treatment options. It does NOT include all information about conditions, treatments, medications, side effects, or risks that may apply to a specific patient. It is not intended to be medical advice or a substitute for the medical advice, diagnosis, or treatment of a health care provider based on the health care provider's examination and assessment of a patient's specific and unique circumstances. Patients must speak with a health care provider for complete information about their health, medical questions, and treatment options, including any risks or benefits regarding use of medications. This information does not endorse any treatments or medications as safe, effective, or approved for treating a specific patient. UpToDate, Inc. and its affiliates disclaim any warranty or liability relating to this information or the use thereof.The use of this information is governed by the Terms of Use, available at https://www.[employer].com/en/know/clinical-effectiveness-terms. 2024© UpToDate, Inc. and its affiliates and/or licensors. All rights reserved.  Copyright   © 2024 UpToDate, Inc. and/or its affiliates. All rights reserved.  Patient Education     Routine physical for adults   The Basics   Written by the doctors and editors at [employer]   What is a physical? -- A physical is a routine visit, or \"check-up,\" with your doctor. You might also hear it called a \"wellness visit\" or \"preventive visit.\"  During each visit, the doctor will:   Ask about your physical and mental health   Ask about your habits, " "behaviors, and lifestyle   Do an exam   Give you vaccines if needed   Talk to you about any medicines you take   Give advice about your health   Answer your questions  Getting regular check-ups is an important part of taking care of your health. It can help your doctor find and treat any problems you have. But it's also important for preventing health problems.  A routine physical is different from a \"sick visit.\" A sick visit is when you see a doctor because of a health concern or problem. Since physicals are scheduled ahead of time, you can think about what you want to ask the doctor.  How often should I get a physical? -- It depends on your age and health. In general, for people age 21 years and older:   If you are younger than 50 years, you might be able to get a physical every 3 years.   If you are 50 years or older, your doctor might recommend a physical every year.  If you have an ongoing health condition, like diabetes or high blood pressure, your doctor will probably want to see you more often.  What happens during a physical? -- In general, each visit will include:   Physical exam - The doctor or nurse will check your height, weight, heart rate, and blood pressure. They will also look at your eyes and ears. They will ask about how you are feeling and whether you have any symptoms that bother you.   Medicines - It's a good idea to bring a list of all the medicines you take to each doctor visit. Your doctor will talk to you about your medicines and answer any questions. Tell them if you are having any side effects that bother you. You should also tell them if you are having trouble paying for any of your medicines.   Habits and behaviors - This includes:   Your diet   Your exercise habits   Whether you smoke, drink alcohol, or use drugs   Whether you are sexually active   Whether you feel safe at home  Your doctor will talk to you about things you can do to improve your health and lower your risk of health " "problems. They will also offer help and support. For example, if you want to quit smoking, they can give you advice and might prescribe medicines. If you want to improve your diet or get more physical activity, they can help you with this, too.   Lab tests, if needed - The tests you get will depend on your age and situation. For example, your doctor might want to check your:   Cholesterol   Blood sugar   Iron level   Vaccines - The recommended vaccines will depend on your age, health, and what vaccines you already had. Vaccines are very important because they can prevent certain serious or deadly infections.   Discussion of screening - \"Screening\" means checking for diseases or other health problems before they cause symptoms. Your doctor can recommend screening based on your age, risk, and preferences. This might include tests to check for:   Cancer, such as breast, prostate, cervical, ovarian, colorectal, prostate, lung, or skin cancer   Sexually transmitted infections, such as chlamydia and gonorrhea   Mental health conditions like depression and anxiety  Your doctor will talk to you about the different types of screening tests. They can help you decide which screenings to have. They can also explain what the results might mean.   Answering questions - The physical is a good time to ask the doctor or nurse questions about your health. If needed, they can refer you to other doctors or specialists, too.  Adults older than 65 years often need other care, too. As you get older, your doctor will talk to you about:   How to prevent falling at home   Hearing or vision tests   Memory testing   How to take your medicines safely   Making sure that you have the help and support you need at home  All topics are updated as new evidence becomes available and our peer review process is complete.  This topic retrieved from [employer] on: May 02, 2024.  Topic 589860 Version 1.0  Release: 32.4.3 - C32.122  © 2024 UpToDate, Inc. " and/or its affiliates. All rights reserved.  Consumer Information Use and Disclaimer   Disclaimer: This generalized information is a limited summary of diagnosis, treatment, and/or medication information. It is not meant to be comprehensive and should be used as a tool to help the user understand and/or assess potential diagnostic and treatment options. It does NOT include all information about conditions, treatments, medications, side effects, or risks that may apply to a specific patient. It is not intended to be medical advice or a substitute for the medical advice, diagnosis, or treatment of a health care provider based on the health care provider's examination and assessment of a patient's specific and unique circumstances. Patients must speak with a health care provider for complete information about their health, medical questions, and treatment options, including any risks or benefits regarding use of medications. This information does not endorse any treatments or medications as safe, effective, or approved for treating a specific patient. UpToDate, Inc. and its affiliates disclaim any warranty or liability relating to this information or the use thereof.The use of this information is governed by the Terms of Use, available at https://www.[employer]com/en/know/clinical-effectiveness-terms. 2024© UpToDate, Inc. and its affiliates and/or licensors. All rights reserved.  Copyright   © 2024 UpToDate, Inc. and/or its affiliates. All rights reserved.    Patient Education     High cholesterol   The Basics   Written by the doctors and editors at [facility]   What is cholesterol? -- Cholesterol is a substance found in blood. Everyone has some. It is needed for good health. But people sometimes have too much cholesterol.  Compared with people with normal cholesterol, people with high cholesterol have a higher risk of heart attack, stroke, and other health problems. The higher your cholesterol, the higher your risk  "of these problems.  Are there different types of cholesterol? -- Yes, there are a few different types. If you get a cholesterol test, you might hear your doctor or nurse talk about:   Total cholesterol   LDL cholesterol - Some people call this the \"bad\" cholesterol. That's because having high LDL levels raises your risk of heart attack, stroke, and other health problems.   HDL cholesterol - Some people call this the \"good\" cholesterol. That's because people with high HDL levels tend to have a lower risk of heart attack, stroke, and other health problems.   Non-HDL cholesterol - Non-HDL cholesterol is your total cholesterol minus your HDL cholesterol.   Triglycerides - Triglycerides are not cholesterol. They are another type of fat. But they often get measured when cholesterol is measured. (Having high triglycerides also seems to increase the risk of heart attack and stroke.)  What should my numbers be? -- Ask your doctor or nurse what your numbers should be. Different people need different goals. If you live outside of the [place], see the table (table 1).  In general, people who do not already have heart disease should aim for:   Total cholesterol below 200   LDL cholesterol below 130, or much lower if they are at risk of heart attack or stroke   HDL cholesterol above 60   Non-HDL cholesterol below 160, or lower if they are at risk of heart attack or stroke   Triglycerides below 150  Remember, though, that many people who cannot meet these goals still have a low risk of heart attack and stroke.  What should I do if I have high cholesterol? -- Ask your doctor what your overall risk of heart attack and stroke is. Just having high cholesterol is not always a reason to worry. Having high cholesterol is just one of many things that can increase your risk of heart attack and stroke.  Other things that increase your risk include:   Smoking   High blood pressure   Having a parent or sibling who got heart disease at a young age " "- Young, in this case, means younger than 55 for males and younger than 65 for females.   A diet that is not heart healthy - A \"heart-healthy\" diet includes lots of fruits and vegetables, fiber, and healthy fats (like those found in fish, nuts, and certain oils). It also means limiting sugar and unhealthy fats.   Older age  If you are at high risk of heart attack and stroke, having high cholesterol is a problem. But if you are at low risk, high cholesterol might not need treatment.  Should I take medicine to lower cholesterol? -- Not everyone who has high cholesterol needs medicines. Your doctor or nurse will decide if you need them based on your age, family history, and other health concerns.  There are many different medicines used to lower cholesterol (table 2). Some help your body make less cholesterol. Some keep your body from absorbing cholesterol from foods. Some help your body get rid of cholesterol faster. The medicines most often used to treat high cholesterol are called \"statins.\"  You should probably take a statin if you:   Already had a heart attack or stroke   Have known heart disease   Have diabetes   Have a condition called \"peripheral artery disease,\" which makes it painful to walk, and happens when the arteries in your legs get clogged with fatty deposits   Have an \"abdominal aortic aneurysm,\" which is a widening of the main artery in the belly  Most people with any of the conditions listed above should take a statin no matter what their cholesterol level is. If your doctor or nurse prescribes a statin, it's important to keep taking it. The medicine might not make you feel any different. But it can help prevent heart attack, stroke, and death.  If your doctor or nurse recommends taking medicine to help lower your cholesterol, make sure that you know what it is called. Follow all the instructions for how to take it. For example, some medicines work better when you take them in the evening. Some need " "to be taken with food.  Tell your doctor or nurse if your medicine causes any side effects that bother you. They might be able to switch you to a different medicine.  Can I lower my cholesterol without medicines? -- Yes. You can help lower your cholesterol by doing these things:   You can lower your LDL, or \"bad,\" cholesterol by avoiding red meat, butter, fried foods, cheese, and other foods that have a lot of saturated fat.   You can lower triglycerides by avoiding sugary foods, fried foods, and excess alcohol.   If you have excess weight, it can help to lose weight. Your doctor or nurse can help you do this in a healthy way.   Try to get regular physical activity. Even gentle forms of exercise, like walking, are good for your health.  Even if these steps don't change your cholesterol very much, they can improve your health in many other ways.  All topics are updated as new evidence becomes available and our peer review process is complete.  This topic retrieved from [email or web address] on: Mar 01, 2024.  Topic 07274 Version 25.0  Release: 32.2.4 - C32.59  © 2024 UpToDate, Inc. and/or its affiliates. All rights reserved.  table 1: Cholesterol and triglyceride measurements in the [place] and elsewhere     Measurement used within the [place] Milligrams/deciliter (mg/dL)  Measurement used most places outside of the [place] Millimoles/liter (mmol/Liter)     Level to aim for  Level to aim for    Total cholesterol  Below 200 Below 5.17   LDL cholesterol  Below 130, or much lower if at risk of heart attack and stroke Below 3.36, or much lower if at risk of heart attack and stroke   HDL cholesterol  Above 60 Above 1.55   Triglycerides  Below 150 Below 1.7   Cholesterol is measured differently in the [place] than it is in most other countries. This table shows values used within and outside of the [place]. It includes the cholesterol and triglyceride levels that most people who do not have heart disease should aim for.  Graphic 38256 Version 5.0  table 2: " Lipid-lowering medicines  Generic name  Brand name    Statins    Atorvastatin Lipitor   Fluvastatin Lescol, Lescol XL   Lovastatin Mevacor, Altoprev   Pitavastatin Livalo   Pravastatin Pravachol   Rosuvastatin Crestor   Simvastatin Zocor   PCSK9 inhibitors    Alirocumab Praluent   Evolocumab Repatha, Repatha SureClick   Cholesterol absorption inhibitors    Ezetimibe Zetia   Bile acid sequestrants    Cholestyramine Prevalite, Questran, Questran Light   Colesevelam Welchol   Colestipol Colestid   Niacin (nicotinic acid)    Niacin immediate release     Niacin extended release Niaspan   Fibrates    Fenofibrate Fenoglide, Tricor, Triglide, others   Gemfibrozil Lopid   Brand names listed are for medicines available in the [place] and some other countries.  In[employer] 43832 Version 7.0  Consumer Information Use and Disclaimer   Disclaimer: This generalized information is a limited summary of diagnosis, treatment, and/or medication information. It is not meant to be comprehensive and should be used as a tool to help the user understand and/or assess potential diagnostic and treatment options. It does NOT include all information about conditions, treatments, medications, side effects, or risks that may apply to a specific patient. It is not intended to be medical advice or a substitute for the medical advice, diagnosis, or treatment of a health care provider based on the health care provider's examination and assessment of a patient's specific and unique circumstances. Patients must speak with a health care provider for complete information about their health, medical questions, and treatment options, including any risks or benefits regarding use of medications. This information does not endorse any treatments or medications as safe, effective, or approved for treating a specific patient. UpToDate, Inc. and its affiliates disclaim any warranty or liability relating to this information or the use thereof.The use of this information  is governed by the Terms of Use, available at https://www.[employer].com/en/know/clinical-effectiveness-terms. 2024© [employer] and its affiliates and/or licensors. All rights reserved.  Copyright   © 2024 [employer] and/or its affiliates. All rights reserved.

## 2024-12-20 NOTE — ASSESSMENT & PLAN NOTE
Recommend lifestyle modifications.    Orders:  •  TSH, 3rd generation with Free T4 reflex; Future

## 2024-12-20 NOTE — PROGRESS NOTES
Assessment/Plan:  Assessment & Plan  Annual physical exam  Health Maintenance   Topic Date Due   • Annual Physical  12/15/2024   • COVID-19 Vaccine (4 - 2024-25 season) 03/20/2025 (Originally 9/1/2024)   • Depression Screening  12/20/2025   • DTaP,Tdap,and Td Vaccines (2 - Td or Tdap) 02/08/2026   • Zoster Vaccine (1 of 2) 01/06/2038   • RSV Vaccine Age 60+ Years (1 - 1-dose 75+ series) 01/06/2063   • HIV Screening  Completed   • Hepatitis C Screening  Completed   • Influenza Vaccine  Completed   • Meningococcal B Vaccine  Aged Out   • RSV Vaccine age 0-20 Months  Aged Out   • Pneumococcal Vaccine: Pediatrics (0 to 5 Years) and At-Risk Patients (6 to 64 Years)  Aged Out   • HIB Vaccine  Aged Out   • IPV Vaccine  Aged Out   • Hepatitis A Vaccine  Aged Out   • Meningococcal ACWY Vaccine  Aged Out   • HPV Vaccine  Aged Out              Hyperlipidemia, unspecified hyperlipidemia type    Stable s statin.  Recommend lifestyle modifications.      Orders:  •  CBC and differential; Future  •  Comprehensive metabolic panel; Future  •  Lipid panel; Future  •  TSH, 3rd generation with Free T4 reflex; Future  •  LDL cholesterol, direct; Future    Hypertriglyceridemia    Stable s Rx.  Recommend lifestyle modifications.      Orders:  •  CBC and differential; Future  •  Comprehensive metabolic panel; Future  •  Lipid panel; Future  •  TSH, 3rd generation with Free T4 reflex; Future  •  LDL cholesterol, direct; Future    Major depressive disorder in full remission, unspecified whether recurrent (HCC)    Stable s Elavil 25mg QD.    Orders:  •  TSH, 3rd generation with Free T4 reflex; Future    Irritable bowel syndrome, unspecified type    Improved c dietary changes.  Stable s Elavil 25mg QD.    Orders:  •  TSH, 3rd generation with Free T4 reflex; Future    Anxiety    Stable s Elavil 25mg QD.  Orders:  •  TSH, 3rd generation with Free T4 reflex; Future    Asymptomatic varicose veins of right lower extremity    Advise exercise, leg  elevation, and TEDS.  Handout given.             Class 2 severe obesity with serious comorbidity and body mass index (BMI) of 35.0 to 35.9 in adult, unspecified obesity type (HCC)    Recommend lifestyle modifications.    Orders:  •  TSH, 3rd generation with Free T4 reflex; Future    Obesity (BMI 35.0-39.9 without comorbidity)      Recommend lifestyle modifications.      Orders:  •  TSH, 3rd generation with Free T4 reflex; Future  •  Vitamin D 25 hydroxy; Future    Severe obesity (BMI 35.0-39.9) with comorbidity (HCC)      Recommend lifestyle modifications.    Orders:  •  TSH, 3rd generation with Free T4 reflex; Future  •  Vitamin D 25 hydroxy; Future    Screening for diabetes mellitus    Orders:  •  Hemoglobin A1C; Future    Gastroesophageal reflux disease with esophagitis, unspecified whether hemorrhage    Improved c dietary changes.  Watch GERD diet.           BMI 35.0-35.9,adult    Recommend lifestyle modifications.         Screening for cardiovascular condition    Orders:  •  CBC and differential; Future  •  Comprehensive metabolic panel; Future  •  Lipid panel; Future  •  LDL cholesterol, direct; Future          Return in about 1 year (around 12/20/2025) for Physical -  HyperTG, Anx/Dep, IBS, Labs.      Future Appointments   Date Time Provider Department Center   12/23/2025  8:00 AM Fardieh Lobo DO [facility]          Subjective:     Yeison is a 34 y.o. male who presents today for a follow-up on his chronic medical conditions.        HPI:  Chief Complaint   Patient presents with   • Physical Exam     -- Above per clinical staff and reviewed. --      HPI      Today:      Return in about 1 year (around 12/15/2024) for Physical -  HyperTG, Anx/Dep, IBS, Labs     6mo OV        Obesity - Not watching diet.  No regular exercise.  Has [employer] bike at home.  He would like to join the [employer] in 2025.       Hyperlipidemia / Hypertriglyceridemia - No meds previously.       Depression / Anxiety - Mood  is stable.  Per patient request, D/C Elavil 25mg 1 pill QHS.  No higher doses or other mood meds previously.  Good social supports.  No SI/HI/AH/VH.  No counseling previously.  H/O of excessive ETOH use after losing job for 1 year previously - now drinking is controlled.         PHQ-2/9 Depression Screening    Little interest or pleasure in doing things: 0 - not at all  Feeling down, depressed, or hopeless: 0 - not at all  Trouble falling or staying asleep, or sleeping too much: 0 - not at all  Feeling tired or having little energy: 1 - several days  Poor appetite or overeatin - not at all  Feeling bad about yourself - or that you are a failure or have let yourself or your family down: 0 - not at all  Trouble concentrating on things, such as reading the newspaper or watching television: 0 - not at all  Moving or speaking so slowly that other people could have noticed. Or the opposite - being so fidgety or restless that you have been moving around a lot more than usual: 0 - not at all  Thoughts that you would be better off dead, or of hurting yourself in some way: 0 - not at all  PHQ-9 Score: 1  PHQ-9 Interpretation: No or Minimal depression       JAMIE-7 Flowsheet Screening    Flowsheet Row Most Recent Value   Over the last two weeks, how often have you been bothered by the following problems?     Feeling nervous, anxious, or on edge 1   Not being able to stop or control worrying 0   Worrying too much about different things 0   Trouble relaxing  0   Being so restless that it's hard to sit still 0   Becoming easily annoyed or irritable  0   Feeling afraid as if something awful might happen 0   How difficult have these problems made it for you to do your work, take care of things at home, or get along with other people?  Not difficult at all   JAMIE Score  1             GERD - Stable.  No longer eating red meat, rarely eating milk and dairy.  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  Next appt PRN.  S/p Normal HIDA scan, U/S,  "CT negative for gallstones.  Seen by [facility] Gen [facility] Dr. Carlson 5/12/22 - next appt PRN.  S/p Colonoscopy 5/26/22, EGD 2022 - GERD and erosions per patient.  Previously on Omeprazole, Tums.       IBS - Stable s Elavil 25mg QHS.           Reviewed:  Labs 12/18/24, Gen Surgery 6/27/23     Sees Dentist q6 months.  Sees Optho q2 years.        The following portions of the patient's history were reviewed and updated as appropriate: allergies, current medications, past family history, past medical history, past social history, past surgical history and problem list.      Review of Systems   Constitutional:  Positive for fatigue (At the end of day). Negative for appetite change, chills, diaphoresis and fever.   Respiratory:  Negative for chest tightness and shortness of breath.    Cardiovascular:  Negative for chest pain.   Gastrointestinal:  Negative for abdominal pain, blood in stool, diarrhea, nausea and vomiting.   Genitourinary:  Negative for dysuria.        Current Outpatient Medications   Medication Sig Dispense Refill   • Multiple Vitamins-Minerals (AIRBORNE PO) Take 1 tablet by mouth in the morning       No current facility-administered medications for this visit.       Objective:  /78   Pulse 83   Temp (!) 96.6 °F (35.9 °C) (Tympanic)   Resp 16   Ht 5' 8\" (1.727 m)   Wt 106 kg (233 lb)   SpO2 98%   BMI 35.43 kg/m²    Wt Readings from Last 3 Encounters:   12/20/24 106 kg (233 lb)   12/15/23 105 kg (231 lb 6.4 oz)   08/25/23 103 kg (227 lb)      BP Readings from Last 3 Encounters:   12/20/24 112/78   12/15/23 118/80   11/10/23 147/82          Physical Exam  Vitals and nursing note reviewed.   Constitutional:       General: He is not in acute distress.     Appearance: Normal appearance. He is well-developed. He is obese. He is not ill-appearing or toxic-appearing.   HENT:      Head: Normocephalic and atraumatic.      Right Ear: Tympanic membrane, ear canal and external ear normal.      Left Ear: " Tympanic membrane, ear canal and external ear normal.      Nose: Nose normal.      Right Sinus: No maxillary sinus tenderness or frontal sinus tenderness.      Left Sinus: No maxillary sinus tenderness or frontal sinus tenderness.      Mouth/Throat:      Mouth: Mucous membranes are moist.      Pharynx: Oropharynx is clear. Uvula midline.      Tonsils: No tonsillar exudate.   Eyes:      Extraocular Movements: Extraocular movements intact.      Conjunctiva/sclera: Conjunctivae normal.      Pupils: Pupils are equal, round, and reactive to light.   Cardiovascular:      Rate and Rhythm: Normal rate and regular rhythm.      Pulses: Normal pulses.      Heart sounds: Normal heart sounds.   Pulmonary:      Effort: Pulmonary effort is normal.      Breath sounds: Normal breath sounds.   Abdominal:      General: Bowel sounds are normal. There is no distension.      Palpations: Abdomen is soft. There is no mass.      Tenderness: There is no abdominal tenderness. There is no guarding or rebound.   Musculoskeletal:         General: No swelling or tenderness.      Cervical back: Neck supple.      Right lower leg: No edema.      Left lower leg: No edema.   Lymphadenopathy:      Cervical: No cervical adenopathy.   Skin:     Findings: No rash.      Comments: Right medial calf c nontender varicose vein   Neurological:      General: No focal deficit present.      Mental Status: He is alert and oriented to person, place, and time. Mental status is at baseline.   Psychiatric:         Mood and Affect: Mood normal.         Behavior: Behavior normal.         Thought Content: Thought content normal.         Judgment: Judgment normal.         Lab Results:      Lab Results   Component Value Date    WBC 4.54 12/18/2024    HGB 16.0 12/18/2024    HCT 47.6 12/18/2024     12/18/2024    TRIG 211 (H) 12/18/2024    HDL 48 12/18/2024    LDLDIRECT 168 (H) 12/18/2024    ALT 31 12/18/2024    AST 26 12/18/2024    K 4.0 12/18/2024     12/18/2024  "   CREATININE 0.80 12/18/2024    BUN 16 12/18/2024    CO2 28 12/18/2024    TSH 2.87 03/13/2021    INR 1.0 03/05/2021    GLUF 86 12/18/2024    HGBA1C 5.0 12/18/2024     No results found for: \"URICACID\"  Invalid input(s): \"BASENAME\" Vitamin D    No results found.     POCT Labs        Depression Screening and Follow-up Plan: Patient was screened for depression during today's encounter. They screened negative with a PHQ-9 score of 1.                    "

## 2025-03-19 ENCOUNTER — OFFICE VISIT (OUTPATIENT)
Dept: FAMILY MEDICINE CLINIC | Facility: CLINIC | Age: 35
End: 2025-03-19
Payer: COMMERCIAL

## 2025-03-19 VITALS
SYSTOLIC BLOOD PRESSURE: 102 MMHG | TEMPERATURE: 97.4 F | RESPIRATION RATE: 16 BRPM | OXYGEN SATURATION: 98 % | BODY MASS INDEX: 33.95 KG/M2 | DIASTOLIC BLOOD PRESSURE: 82 MMHG | HEIGHT: 68 IN | HEART RATE: 83 BPM | WEIGHT: 224 LBS

## 2025-03-19 DIAGNOSIS — F32.5 MAJOR DEPRESSIVE DISORDER IN FULL REMISSION, UNSPECIFIED WHETHER RECURRENT (HCC): ICD-10-CM

## 2025-03-19 DIAGNOSIS — F41.9 ANXIETY: ICD-10-CM

## 2025-03-19 DIAGNOSIS — B34.9 VIRAL INFECTION, UNSPECIFIED: Primary | ICD-10-CM

## 2025-03-19 DIAGNOSIS — E66.811 CLASS 1 OBESITY WITH SERIOUS COMORBIDITY AND BODY MASS INDEX (BMI) OF 33.0 TO 33.9 IN ADULT, UNSPECIFIED OBESITY TYPE: ICD-10-CM

## 2025-03-19 PROCEDURE — 99215 OFFICE O/P EST HI 40 MIN: CPT | Performed by: FAMILY MEDICINE

## 2025-03-19 NOTE — PATIENT INSTRUCTIONS
"Advise Dennis med sinus rinse kit, Mucinex, Claritin/Zyrtec/Allegra/Xyzal, Flonase / Nasacort nasal spray.  Avoid decongestants if you have high blood pressure.      Refer to the back of insurance card for counseling options.    Apps and Websites for Counseling, Anxiety/Depression, Chronic Pain, Lifestyle Change, Problem-solving, Self-Esteem, Anger Management, Coping with Uncertainty    (Prices current as of 9/5/19)    Mood Kit - Available in Apple Jose Store for $4.99.  Supports a person's success in specific situations, includes thought , mood tracker, and journal.  Can be accessed in an unstructured way and used as an unguided self-help jose.      2.  Moodnotes - Available in Apple Jose Store for $4.99.  Focuses on healthier thinking habits and identifying \"traps\" in thinking.  Tracks mood over a period of time and identifies factors that influence mood.      3.  MoodMission - Available in Apple Jose Store and Google Play for free.  Includes five \"missions\" to promote confidence in handling stressors and coping in specific situations.  The jose personalizes its style and techniques based on when the user engages it most frequently.  In-jose rewards are used to motivate, increase fun and self-confidence.  Helpful for patients who need improvement in mood or a decrease in anxiety and depression symptoms.      4.   What's Up - Available in Apple Jose Store and Google Play for free.  Recognizes negative thinking patterns and methods to overcome them.  Uses helpful metaphors, a catastrophe scale, grounding techniques, and breathing exercises.  Has the ability to sync data across multiple devices and protect this information with a unique pass code.  Has the capability to be active in forums where people can discuss similar feelings and strategies that have been helpful.      5.  Moodpath - Available in Apple Jose Store and Google Play for free.  Uses daily screenings to create a better understanding of thoughts, " feelings, and emotions.  When needed, it provides a discussion guide to talking with a medical professional based on the responses to daily screenings.  Includes over 150 psychological exercises and videos to promote and strengthen overall mental health.    6.  MindShift - Available in Apple Jose Store and Google Play for free.  Helpful for youth and young adults in coping with anxiety.  Creates an individualized toolbox to help users deal with test anxiety, perfectionism, social anxiety, worry, panic, and conflict.  Includes directions on how to construct “belief experiments” to trial common beliefs that feed anxiety, guided relaxation, as well as tools and tips to help establish and accomplish goals.  Differentiates between helpful and unhelpful anxiety, and explains how to overcome fears by gradually facing them in manageable steps.    7.  CBT-I  - Available in Apple Jose Store and Google Play for free.  For insomnia.  Educates about sleep, developing positive sleep routines, and improved sleep environment.  Encourages user to change behaviors which will provide confidence for better sleep on a regular basis.    8.  Financetesetudes.uk - Free website.  Provides self-help and therapy resources that encourages change to combat negative and destructive thought patterns.  Includes access to numerous handouts on a variety of symptoms related to anxiety, depression, low self-esteem, panic attacks, social disorders, and more.  The solution section has interventions that can be printed and saved for future use.    9.  Woebot - Available in Apple Jose store and Google Play for free.  Recommended for age 17+.  Friendly self-care  that helps you think through situations with step-by-step guidance using counseling tools, learn about yourself with intelligent mood tracking, and master skills to reduce stress and live happier through 100+ evidence-based stories from clinicians.  Chat as often or as little as you'd like,  "whenever you'd like to.      10.  Wy:  AI  CBT DBT Chatbot - Available in Apple appsstore and Google Play for free, has in-jose purchases.  Recommended for 12+.  Psychologist support at $30/month, 50% off to start.  AI  / chatbot is free.  Uses techniques of CBT, DBT, yoga, and meditation to support your needs regarding stress, anxiety, sleep, loss, and a full range of other mental health and wellness issues.      11.  Curable Pain Relief - Available in Apple Jose store and Google Play for free, has in-jose purchases.   Teaches about the chronic pain cycle and how to reverse it, while retraining your brain and nervous system for long-term results.  Smart  Maria C guides user through updates in pain science to identify, target, eliminate the factors that are keeping user \"stuck\" in the pain cycle.      12.  Talkspace Online Therapy - Available in Apple Jose store and Google Play for a fee.  Subscription service, starting at $59/week (billed monthly).  All plans include unlimited messaging, and add live video sessions if desired.  Unlimited messaging therapy for teens ages 13-17 and special services for couples therapy.  You can change therapists or stop subscription renewal at any time.  Recommended for 13+.  User is matched with a licensed therapist in your state and communicate on your device by text, audio, and video from anywhere, at any time.  User will hear back at least once a day, 5 days per week.      Counseling services:    Go to HaloSource to find a therapist near you. You can sort by gender, insurance, issues, etc.       [facility]   [facility] [place], [place]   [phone]    [facility]  [address], [address], [place] [address]  [email or web address]  [phone]    [facility]   [address] [place] [address], [address]  [place] [address] [phone]  Appointment - [facility] (BeehiveID)     [facility]   [address], [address], [place] [address]  [phone]    [facility], [facility]   [address], [place] [address]  [phone]    [facility], [facility], Alicia Telles M.S., [address], [address]  [place] [address]  [email or web address]  [phone]    [facility]   [email or web address]  [address] [place] [address]  [phone]    [facility], [facility] (they have equine therapy too)  [address], [address],  [place] [address]  [address],  [place], [place] [id number] [phone]     [address]; [place], [place] [address]   [phone]     [facility]  [address], [address]  [place], [place] [address]    [phone]     [facility]   [address] [place] [address]     [phone]       [facility]  [address] [place] [address]  [phone]    [facility], [facility]   [address]   [place] [address]  [email or web address]    Cristina Allen MSW, LSW  (patients age 11-adult)   [address], [place], [place] [address]   [phone]     Devaughn Mcdonnell[facility] hospitals in [facility] United StatesTong  [address]   [place] [address] [phone]     Thuy Moore, LCSW  [address], [address], [place] [address] [phone]     Genevieve Garcia [facility] (specializing in addiction)  [address] [place] [id number] [phone]    Delmi Salcedo  [address], [place] [address]   [phone]      Bree Fairchild, MS, LPC, NCC  [address], [address], [place] [address]  [phone]     Cande Wise PsyD  [address], [place] [address] [phone]    Kika Barnes, LPC   [address],  [place] [address] [phone]      Hotlines:   Please program these numbers into your phone in case you or someone you know needs them. All services are free.     986  People who call, text, or chat with 988 will be directly connected to the National Suicide Prevention Lifeline. The existing Lifeline phone number (1-909.387.1563) will remain available. Callers can also connect with the [facility] Crisis Line or assistance in [place]. 988 can be used by anyone, any time, at no cost. Trained crisis response professionals can support individuals considering suicide, self-harm, or any behavioral, substance abuse or misuse or mental health need for themselves or people looking for help for a loved one experiencing a mental health crisis. Lifeline services are available 24 hours a day, seven days a week at no cost to the caller.  Crisis Text Line: text 269927     You are connected to a Crisis Counselor to bring a hot moment to a cool calm through active listening and collaborative problem solving.   WarmLine:  999.375.6839 or 460-874-1097   They provide a supportive listening ear if you need it. They also can also provide information about mental health concerns and services.   Crisis Line:  [facility] [phone],  [facility] [phone], [facility]: [phone]   24/7 crisis counseling, on-site counseling and walk-in counseling services available.   National Suicide Prevention Lifeline:  1-722.583.8686.  En Espanol Nacional de Prevencion del Suicidio 1-450-303-8531   This is free, confidential, and available 24/7.   [place]: [phone]   For those facing or having survived abuse 24 hour confidential help line, emergency safe house, counseling, advocacy and education.    If you or someone your know are feeling as though you are going to hurt yourself, do not wait - GET HELP RIGHT AWAY.  Go to the closest Emergency Room, call 911, or call someone you trust, family  member or friend to be with you until you can get some help.    Additional Counseling Resources:       - [facility] in [place], [place]  Phone:  [phone]  Website:  [email or web address]     - [facility] in [place]  Phone:  [phone]  Website:  [email or web address]

## 2025-03-19 NOTE — ASSESSMENT & PLAN NOTE
Orders:  •  amitriptyline (ELAVIL) 25 mg tablet; Take 1 tablet (25 mg total) by mouth daily at bedtime  •  Comprehensive metabolic panel; Future

## 2025-03-19 NOTE — PROGRESS NOTES
PHQ-2/9 Depression Screening    Little interest or pleasure in doing things: 1 - several days  Feeling down, depressed, or hopeless: 3 - nearly every day  Trouble falling or staying asleep, or sleeping too much: 3 - nearly every day  Feeling tired or having little energy: 3 - nearly every day  Poor appetite or overeating: 3 - nearly every day  Feeling bad about yourself - or that you are a failure or have let yourself or your family down: 3 - nearly every day  Trouble concentrating on things, such as reading the newspaper or watching television: 2 - more than half the days  Moving or speaking so slowly that other people could have noticed. Or the opposite - being so fidgety or restless that you have been moving around a lot more than usual: 1 - several days  Thoughts that you would be better off dead, or of hurting yourself in some way: 0 - not at all           COVID-19 Outpatient Progress Note  Name: Yeisonmatthew Medel      : 1990      MRN: [id number]  Encounter Provider: Farideh Lobo DO  Encounter Date: 3/19/2025   Encounter department: [facility]  :  Assessment & Plan  Viral infection, unspecified         Major depressive disorder in full remission, unspecified whether recurrent (HCC)    Orders:  •  amitriptyline (ELAVIL) 25 mg tablet; Take 1 tablet (25 mg total) by mouth daily at bedtime  •  Comprehensive metabolic panel; Future    Anxiety    Orders:  •  amitriptyline (ELAVIL) 25 mg tablet; Take 1 tablet (25 mg total) by mouth daily at bedtime  •  Comprehensive metabolic panel; Future    Class 1 obesity with serious comorbidity and body mass index (BMI) of 33.0 to 33.9 in adult, unspecified obesity type         Viral infection, unspecified         Major depressive disorder in full remission, unspecified whether recurrent (HCC)         Anxiety         Class 1 obesity with serious comorbidity and body mass index (BMI) of 33.0 to 33.9 in adult, unspecified obesity type            Viral infection, unspecified               Disposition:     Risks and benefits of COVID-19 vaccination was discussed with patient.   Viral Illness / Anxiety / Depression - Patient declines Tessalon Perles and Albuterol HFA.      Advise Dennis med sinus rinse kit, Mucinex, Claritin/Zyrtec/Allegra/Xyzal, Flonase / Nasacort nasal spray.  Avoid decongestants if you have high blood pressure.    Restart Elavil 25mg 1 pill QHS.  Counseling advised.  Smart phone nat list and counseling list provided today.  Patient declined Partial Inpatient Psych consult.           Recent Visits  No visits were found meeting these conditions.  Showing recent visits within past 7 days and meeting all other requirements  Today's Visits  Date Type Provider Dept   03/19/25 Office Visit DO [facility]   Showing today's visits and meeting all other requirements  Future Appointments  No visits were found meeting these conditions.  Showing future appointments within next 150 days and meeting all other requirements    History of Present Illness     Subjective:   Yeison Medel is a 35 y.o. male who is concerned about COVID-19. Patient's symptoms include fatigue, nasal congestion, cough (Dry), shortness of breath (Mild, wiht activty) and headache (Right Parietal). Patient denies fever (Resolved), chills, rhinorrhea (Resolved), sore throat (Resolved), anosmia, loss of taste, chest tightness, abdominal pain, nausea, vomiting, diarrhea and myalgias.     - Date of symptom onset: 3/12/2025      COVID-19 vaccination status: Fully vaccinated with booster    Exposure:   Contact with a person who is under investigation (PUI) for or who is positive for COVID-19 within the last 14 days?: No    Negative COVID test 3/18/25, 3/13/25.      Using Nyquil and Dayquil c benefit.      His wife asked for a separation on Sunday, 3/16/25,which caught him off guard.  Wife does not want to reconcile or attend couple's therapy.  Patient does and is  "mourning the loss of their marriage.  Poor social supports.  No SI/HI/AH/VH.  He is interested in counseling and restarting  Elavil 25mg 1 pill QHS.      Lab Results   Component Value Date    SARSCORONAVI Not Detected 02/23/2021       Review of Systems   Constitutional:  Positive for fatigue. Negative for chills and fever (Resolved).   HENT:  Positive for congestion. Negative for rhinorrhea (Resolved) and sore throat (Resolved).    Respiratory:  Positive for cough (Dry) and shortness of breath (Mild, wiht activty). Negative for chest tightness.    Gastrointestinal:  Negative for abdominal pain, diarrhea, nausea and vomiting.   Musculoskeletal:  Negative for myalgias.   Neurological:  Positive for headaches (Right Parietal).     Objective   /82   Pulse 83   Temp (!) 97.4 °F (36.3 °C) (Tympanic)   Resp 16   Ht 5' 8.25\" (1.734 m)   Wt 102 kg (224 lb)   SpO2 98%   BMI 33.81 kg/m²     Physical Exam  Vitals and nursing note reviewed.   Constitutional:       General: He is not in acute distress.     Appearance: Normal appearance. He is well-developed. He is obese.   HENT:      Head: Normocephalic and atraumatic.      Right Ear: Tympanic membrane, ear canal and external ear normal.      Left Ear: Tympanic membrane, ear canal and external ear normal.      Nose: Nose normal. No congestion.      Mouth/Throat:      Mouth: Mucous membranes are moist.      Pharynx: Oropharynx is clear.   Eyes:      Extraocular Movements: Extraocular movements intact.      Conjunctiva/sclera: Conjunctivae normal.   Cardiovascular:      Rate and Rhythm: Normal rate and regular rhythm.      Pulses: Normal pulses.      Heart sounds: Normal heart sounds. No murmur heard.  Pulmonary:      Effort: Pulmonary effort is normal. No respiratory distress.      Breath sounds: Normal breath sounds.   Musculoskeletal:         General: No swelling or tenderness.      Cervical back: Neck supple.      Right lower leg: No edema.      Left lower leg: No " edema.   Lymphadenopathy:      Cervical: No cervical adenopathy.   Skin:     General: Skin is warm and dry.      Capillary Refill: Capillary refill takes less than 2 seconds.   Neurological:      General: No focal deficit present.      Mental Status: He is alert and oriented to person, place, and time.   Psychiatric:         Attention and Perception: Attention and perception normal.         Mood and Affect: Mood is depressed. Affect is tearful.         Speech: Speech normal.         Behavior: Behavior normal. Behavior is cooperative.         Thought Content: Thought content normal.         Cognition and Memory: Cognition and memory normal.         Judgment: Judgment normal.         Administrative Statements   Encounter provider Farideh Lobo DO      5 minutes spent on chart prep, 35 minutes spent with patient counseling/educating on their diagnoses, tests completed and any new tests ordered, any referrals placed, treatment options, and documentation of above today.   In prescribing new medications, or changing doses, we reviewed the risks and benefits and side effects of these medications along with other treatment options if appropriate.     Answers submitted by the patient for this visit:  Sore Throat Questionnaire (Submitted on 3/19/2025)  Chief Complaint: Sore throat  Chronicity: new  Onset: in the past 7 days  Progression since onset: gradually improving  Pain worse on: neither  Fever: 100 - 100.9 F  Fever duration: 1 to 2 days  pain severity now: mild  Pain - numeric: 2/10  hoarse voice: Yes

## 2025-04-03 NOTE — PATIENT INSTRUCTIONS
Advise Dennis med sinus rinse kit, Mucinex, Claritin/Zyrtec/Allegra/Xyzal, Flonase / Nasacort nasal spray.  Avoid decongestants if you have high blood pressure.      You may use Motrin (Ibuprofen) up to 800mg 3 times daily with food (in 24 hours) as needed for pain or fever.  Caution with GERD.  Take with food.    You may use Tylenol (Acetaminophen) up to 3,000mg daily (in 24 hours) as needed for pain or fever.

## 2025-04-03 NOTE — PROGRESS NOTES
PHQ-2/9 Depression Screening    Little interest or pleasure in doing things: 1 - several days  Feeling down, depressed, or hopeless: 1 - several days  Trouble falling or staying asleep, or sleeping too much: 1 - several days  Feeling tired or having little energy: 1 - several days  Poor appetite or overeatin - several days  Feeling bad about yourself - or that you are a failure or have let yourself or your family down: 1 - several days  Trouble concentrating on things, such as reading the newspaper or watching television: 0 - not at all  Moving or speaking so slowly that other people could have noticed. Or the opposite - being so fidgety or restless that you have been moving around a lot more than usual: 0 - not at all  Thoughts that you would be better off dead, or of hurting yourself in some way: 0 - not at all  PHQ-9 Score: 6  PHQ-9 Interpretation: Mild depression           COVID-19 Outpatient Progress Note  Name: Yeison Medel      : 1990      MRN: [id number]  Encounter Provider: Farideh Lobo DO  Encounter Date: 2025   Encounter department: [facility]  :  Assessment & Plan  Influenza B         Viral infection, unspecified    Orders:  •  POCT rapid flu A and B  •  POCT Rapid Covid Ag    Acute cough    Orders:  •  albuterol (PROVENTIL HFA,VENTOLIN HFA) 90 mcg/act inhaler; Inhale 2 puffs every 4 (four) hours as needed for shortness of breath for up to 10 days  •  benzonatate (TESSALON PERLES) 100 mg capsule; Take 1 capsule (100 mg total) by mouth 3 (three) times a day as needed for cough for up to 10 days    SOB (shortness of breath)    Orders:  •  albuterol (PROVENTIL HFA,VENTOLIN HFA) 90 mcg/act inhaler; Inhale 2 puffs every 4 (four) hours as needed for shortness of breath for up to 10 days    Fever, unspecified fever cause         Major depressive disorder in full remission, unspecified whether recurrent (HCC)  Depression Screening Follow-up Plan: Patient's  depression screening was positive with a PHQ-9 score of 6. Patient advised to follow-up with PCP for further management.         Class 1 obesity with serious comorbidity and body mass index (BMI) of 33.0 to 33.9 in adult, unspecified obesity type           Gastroesophageal reflux disease with esophagitis, unspecified whether hemorrhage         Viral infection, unspecified           Viral infection, unspecified               Disposition:     Rapid antigen testing was performed and the result is negative for COVID-19. Risks and benefits of COVID-19 vaccination was discussed with patient.     Discussed symptom directed medication options with patient.   Flu B / Viral Illness / Fever / Cough / SOB / Depression / Obesity - Negative COVID / Flu A POCT.  Due to good health, patient is not a Tamiflu candidate.  Rest, push fluids.  Start Albuterol HFA PRN, Tessalon Perles 100mg TID PRN.      Advise Dennis med sinus rinse kit, Mucinex, Claritin/Zyrtec/Allegra/Xyzal, Flonase / Nasacort nasal spray.  Avoid decongestants if you have high blood pressure.      You may use Motrin (Ibuprofen) up to 800mg 3 times daily with food (in 24 hours) as needed for pain or fever.  Caution with GERD.  Take with food.    You may use Tylenol (Acetaminophen) up to 3,000mg daily (in 24 hours) as needed for pain or fever.    Recommend lifestyle modifications.             Recent Visits  No visits were found meeting these conditions.  Showing recent visits within past 7 days and meeting all other requirements  Today's Visits  Date Type Provider Dept   04/04/25 Office Visit DO [facility]   Showing today's visits and meeting all other requirements  Future Appointments  No visits were found meeting these conditions.  Showing future appointments within next 150 days and meeting all other requirements    History of Present Illness     Subjective:   Yeison Medel is a 35 y.o. male who is concerned about COVID-19. Patient's symptoms  "include fever (Tmax 102.8 this AM), chills, fatigue, nasal congestion, rhinorrhea, sore throat, anosmia, cough (Dry), shortness of breath (At rest), chest tightness (At rest), myalgias and headache. Patient denies loss of taste, abdominal pain, nausea, vomiting and diarrhea.     - Date of symptom onset: 4/1/2025  - Date of exposure: 4/3/2025      COVID-19 vaccination status: Fully vaccinated with booster    Exposure:   Contact with a person who is under investigation (PUI) for or who is positive for COVID-19 within the last 14 days?: Yes    Negative COVID home test 4/3/25, 3/31/25.     Using Nyquil and Dayquil - last dose last night, sometimes c benefit.        Lab Results   Component Value Date    SARSCORONAVI Not Detected 02/23/2021    SARSCOVAG Negative 04/04/2025       Review of Systems   Constitutional:  Positive for chills, fatigue and fever (Tmax 102.8 this AM).   HENT:  Positive for congestion, rhinorrhea and sore throat. Negative for ear pain.    Respiratory:  Positive for cough (Dry), chest tightness (At rest) and shortness of breath (At rest). Negative for wheezing.    Cardiovascular:  Negative for chest pain.   Gastrointestinal:  Negative for abdominal pain, diarrhea, nausea and vomiting.   Genitourinary:  Negative for dysuria.   Musculoskeletal:  Positive for myalgias.   Skin:  Negative for rash.   Neurological:  Positive for headaches.     Objective   /72   Pulse (!) 112   Temp 100.5 °F (38.1 °C) (Temporal)   Resp 18   Ht 5' 8.25\" (1.734 m)   Wt 101 kg (222 lb 3.2 oz)   SpO2 95%   BMI 33.54 kg/m²     Physical Exam  Vitals and nursing note reviewed.   Constitutional:       General: He is not in acute distress.     Appearance: Normal appearance. He is well-developed. He is obese. He is ill-appearing. He is not toxic-appearing.   HENT:      Head: Normocephalic and atraumatic.      Right Ear: Tympanic membrane, ear canal and external ear normal.      Left Ear: Tympanic membrane, ear canal and " external ear normal.      Nose: Congestion present. No rhinorrhea.      Mouth/Throat:      Mouth: Mucous membranes are moist.      Pharynx: Oropharynx is clear. No oropharyngeal exudate or posterior oropharyngeal erythema.   Eyes:      Extraocular Movements: Extraocular movements intact.      Conjunctiva/sclera: Conjunctivae normal.   Cardiovascular:      Rate and Rhythm: Normal rate and regular rhythm.      Pulses: Normal pulses.      Heart sounds: Normal heart sounds. No murmur heard.  Pulmonary:      Effort: Pulmonary effort is normal. No respiratory distress.      Breath sounds: Normal breath sounds.   Musculoskeletal:         General: No swelling or tenderness.      Cervical back: Neck supple.      Right lower leg: No edema.      Left lower leg: No edema.   Lymphadenopathy:      Cervical: No cervical adenopathy.   Skin:     General: Skin is warm and dry.   Neurological:      General: No focal deficit present.      Mental Status: He is alert and oriented to person, place, and time.   Psychiatric:         Mood and Affect: Mood normal.         Behavior: Behavior normal.         Thought Content: Thought content normal.         Judgment: Judgment normal.         Administrative Statements   Encounter provider Farideh Lobo DO      Answers submitted by the patient for this visit:  Fever Questionnaire (Submitted on 4/4/2025)  Chief Complaint: Fever  Chronicity: new  Onset: yesterday  contaminated food: No  contaminated water: No  history of cancer: No  occupational exposure: No  recent travel: No  immunosuppression: No  recent illness: No  sick contacts: Yes  Frequency: constantly  Progression since onset: gradually worsening  Max temp prior to arrival: 102 to 102.9 F  Temperature source: an oral thermometer  muscle aches: Yes  sleepiness: No

## 2025-04-04 ENCOUNTER — OFFICE VISIT (OUTPATIENT)
Dept: FAMILY MEDICINE CLINIC | Facility: CLINIC | Age: 35
End: 2025-04-04
Payer: COMMERCIAL

## 2025-04-04 VITALS
SYSTOLIC BLOOD PRESSURE: 108 MMHG | HEIGHT: 68 IN | DIASTOLIC BLOOD PRESSURE: 72 MMHG | RESPIRATION RATE: 18 BRPM | BODY MASS INDEX: 33.68 KG/M2 | OXYGEN SATURATION: 95 % | WEIGHT: 222.2 LBS | HEART RATE: 112 BPM | TEMPERATURE: 100.5 F

## 2025-04-04 DIAGNOSIS — J10.1 INFLUENZA B: Primary | ICD-10-CM

## 2025-04-04 DIAGNOSIS — K21.00 GASTROESOPHAGEAL REFLUX DISEASE WITH ESOPHAGITIS, UNSPECIFIED WHETHER HEMORRHAGE: ICD-10-CM

## 2025-04-04 DIAGNOSIS — R06.02 SOB (SHORTNESS OF BREATH): ICD-10-CM

## 2025-04-04 DIAGNOSIS — R50.9 FEVER, UNSPECIFIED FEVER CAUSE: ICD-10-CM

## 2025-04-04 DIAGNOSIS — B34.9 VIRAL INFECTION, UNSPECIFIED: ICD-10-CM

## 2025-04-04 DIAGNOSIS — E66.811 CLASS 1 OBESITY WITH SERIOUS COMORBIDITY AND BODY MASS INDEX (BMI) OF 33.0 TO 33.9 IN ADULT, UNSPECIFIED OBESITY TYPE: ICD-10-CM

## 2025-04-04 DIAGNOSIS — R05.1 ACUTE COUGH: ICD-10-CM

## 2025-04-04 DIAGNOSIS — F32.5 MAJOR DEPRESSIVE DISORDER IN FULL REMISSION, UNSPECIFIED WHETHER RECURRENT (HCC): ICD-10-CM

## 2025-04-04 LAB
SARS-COV-2 AG UPPER RESP QL IA: NEGATIVE
SL AMB POCT RAPID FLU A: NEGATIVE
SL AMB POCT RAPID FLU B: POSITIVE
VALID CONTROL: NORMAL

## 2025-04-04 PROCEDURE — 87804 INFLUENZA ASSAY W/OPTIC: CPT | Performed by: FAMILY MEDICINE

## 2025-04-04 PROCEDURE — 99214 OFFICE O/P EST MOD 30 MIN: CPT | Performed by: FAMILY MEDICINE

## 2025-04-04 PROCEDURE — 87811 SARS-COV-2 COVID19 W/OPTIC: CPT | Performed by: FAMILY MEDICINE

## 2025-04-04 RX ORDER — BENZONATATE 100 MG/1
100 CAPSULE ORAL 3 TIMES DAILY PRN
Qty: 30 CAPSULE | Refills: 0 | Status: SHIPPED | OUTPATIENT
Start: 2025-04-04 | End: 2025-04-14

## 2025-04-04 RX ORDER — ALBUTEROL SULFATE 90 UG/1
2 INHALANT RESPIRATORY (INHALATION) EVERY 4 HOURS PRN
Qty: 18 G | Refills: 0 | Status: SHIPPED | OUTPATIENT
Start: 2025-04-04 | End: 2025-04-14

## 2025-04-04 NOTE — ASSESSMENT & PLAN NOTE
Depression Screening Follow-up Plan: Patient's depression screening was positive with a PHQ-9 score of 6. Patient advised to follow-up with PCP for further management.

## 2025-04-04 NOTE — ASSESSMENT & PLAN NOTE
Improved c dietary changes  Stable on Elavil 25mg QD - patient desires wean  Wean Elavil 25m/2 tab by mouth nightly x 1 week, then 1/2 tab by mouth every other night x 1 week, then stop 
Improved c dietary changes  Watch GERD diet 
Stable on Elavil 25mg QD - patient desires wean  Wean Elavil 25m/2 tab by mouth nightly x 1 week, then 1/2 tab by mouth every other night x 1 week, then stop 
Stable on Elavil 25mg QD - patient desires wean  Wean Elavil 25m/2 tab by mouth nightly x 1 week, then 1/2 tab by mouth every other night x 1 week, then stop 
Stable s Rx  Recommend lifestyle modifications 
Stable s statin  Recommend lifestyle modifications 
Worsening  Recommend lifestyle modifications 
30

## 2025-04-15 DIAGNOSIS — F32.5 MAJOR DEPRESSIVE DISORDER IN FULL REMISSION, UNSPECIFIED WHETHER RECURRENT (HCC): ICD-10-CM

## 2025-04-15 DIAGNOSIS — F41.9 ANXIETY: ICD-10-CM

## 2025-04-30 ENCOUNTER — OFFICE VISIT (OUTPATIENT)
Dept: FAMILY MEDICINE CLINIC | Facility: CLINIC | Age: 35
End: 2025-04-30
Payer: COMMERCIAL

## 2025-04-30 VITALS
HEART RATE: 94 BPM | DIASTOLIC BLOOD PRESSURE: 64 MMHG | SYSTOLIC BLOOD PRESSURE: 104 MMHG | HEIGHT: 68 IN | OXYGEN SATURATION: 98 % | TEMPERATURE: 98.3 F | BODY MASS INDEX: 33.73 KG/M2 | RESPIRATION RATE: 16 BRPM | WEIGHT: 222.56 LBS

## 2025-04-30 DIAGNOSIS — E78.1 HYPERTRIGLYCERIDEMIA: ICD-10-CM

## 2025-04-30 DIAGNOSIS — E78.5 HYPERLIPIDEMIA, UNSPECIFIED HYPERLIPIDEMIA TYPE: ICD-10-CM

## 2025-04-30 DIAGNOSIS — K58.9 IRRITABLE BOWEL SYNDROME, UNSPECIFIED TYPE: ICD-10-CM

## 2025-04-30 DIAGNOSIS — F32.5 MAJOR DEPRESSIVE DISORDER IN FULL REMISSION, UNSPECIFIED WHETHER RECURRENT (HCC): Primary | ICD-10-CM

## 2025-04-30 DIAGNOSIS — F41.9 ANXIETY: ICD-10-CM

## 2025-04-30 DIAGNOSIS — K21.00 GASTROESOPHAGEAL REFLUX DISEASE WITH ESOPHAGITIS, UNSPECIFIED WHETHER HEMORRHAGE: ICD-10-CM

## 2025-04-30 DIAGNOSIS — E66.811 CLASS 1 OBESITY WITH SERIOUS COMORBIDITY AND BODY MASS INDEX (BMI) OF 33.0 TO 33.9 IN ADULT, UNSPECIFIED OBESITY TYPE: ICD-10-CM

## 2025-04-30 PROCEDURE — 99214 OFFICE O/P EST MOD 30 MIN: CPT | Performed by: FAMILY MEDICINE

## 2025-04-30 RX ORDER — MULTIVITAMIN
1 TABLET ORAL DAILY
COMMUNITY

## 2025-04-30 RX ORDER — AMITRIPTYLINE HYDROCHLORIDE 50 MG/1
50 TABLET ORAL
Qty: 30 TABLET | Refills: 1 | Status: SHIPPED | OUTPATIENT
Start: 2025-04-30

## 2025-04-30 NOTE — ASSESSMENT & PLAN NOTE
Improved.  Increase Elavil 50mg QHS.  Continue counseling.      Orders:    amitriptyline (ELAVIL) 50 mg tablet; Take 1 tablet (50 mg total) by mouth daily at bedtime

## 2025-04-30 NOTE — ASSESSMENT & PLAN NOTE
Stable.  Increase Elavil 50mg QHS.    Orders:    amitriptyline (ELAVIL) 50 mg tablet; Take 1 tablet (50 mg total) by mouth daily at bedtime

## 2025-04-30 NOTE — PROGRESS NOTES
Assessment/Plan:  Assessment & Plan  Major depressive disorder in full remission, unspecified whether recurrent (HCC)  Improved.  Increase Elavil 50mg QHS.  Continue counseling.      Orders:    amitriptyline (ELAVIL) 50 mg tablet; Take 1 tablet (50 mg total) by mouth daily at bedtime    Anxiety  Improved.  Increase Elavil 50mg QHS.  Continue counseling.      Orders:    amitriptyline (ELAVIL) 50 mg tablet; Take 1 tablet (50 mg total) by mouth daily at bedtime    Irritable bowel syndrome, unspecified type  Stable.  Increase Elavil 50mg QHS.    Orders:    amitriptyline (ELAVIL) 50 mg tablet; Take 1 tablet (50 mg total) by mouth daily at bedtime    Hyperlipidemia, unspecified hyperlipidemia type  Stable s Rx. Recommend lifestyle modifications.        Hypertriglyceridemia  Stable s Rx. Recommend lifestyle modifications.        Gastroesophageal reflux disease with esophagitis, unspecified whether hemorrhage  Improved c dietary changes.  Watch GERD diet.            Class 1 obesity with serious comorbidity and body mass index (BMI) of 33.0 to 33.9 in adult, unspecified obesity type  Stable.  Recommend lifestyle modifications.                 Return in 6 weeks (on 6/11/2025) for F/U Dep/Anx, Labs.      Future Appointments   Date Time Provider Department Center   6/11/2025  3:40 PM DO [facility]   12/23/2025  8:00 AM Farideh Lobo DO [facility]        Subjective:     Yeison is a 35 y.o. male who presents today for a follow-up on his chronic medical conditions.        HPI:  Chief Complaint   Patient presents with    Follow-up     Return in about 6 weeks (around 4/30/2025) for F/U Dep/Anx, Labs. No new problems or concerns.      -- Above per clinical staff and reviewed. --      HPI      Today:      Return in about 6 weeks (around 4/30/2025) for F/U Dep/Anx, Labs    Patient did not complete labs 3/19/25.        Obesity - Trying to watch diet.  No regular exercise.  Has Pel[employer] bike at home.   He would like to join the [employer] in 2025.       Hyperlipidemia / Hypertriglyceridemia - No meds previously.       Depression / Anxiety - Restart Elavil 25mg 1 pill QHS x 6 weeks.  Mood is 40% improved.  He is feeling ok.  His wife unexpectedly asked for a separation on Sunday, 3/16/25.  Wife does not want to reconcile or attend couple's therapy. Patient does and is mourning the loss of their marriage.  Good social supports. No SI/HI/AH/VH. He is attending counseling alone every 2 weeks - Next appt 5/25.  No higher doses or other mood meds previously.  H/O of excessive ETOH use after losing job for 1 year previously - no longer drinking ETOH.      GERD - Stable.  No longer eating red meat, rarely eating milk and dairy.  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  Next appt PRN.  S/p Normal HIDA scan, U/S, CT negative for gallstones.  Seen by [facility] Dr. Carlson 5/12/22 - next appt PRN.  S/p Colonoscopy 5/26/22, EGD 2022 - GERD and erosions per patient.  Previously on Omeprazole, Tums.       IBS - Stable s Elavil 25mg QHS.         Reviewed:  Labs 12/18/24, Gen Surgery 6/27/23             The following portions of the patient's history were reviewed and updated as appropriate: allergies, current medications, past family history, past medical history, past social history, past surgical history and problem list.      Review of Systems   Constitutional:  Positive for fatigue. Negative for appetite change, chills, diaphoresis and fever.   Respiratory:  Negative for chest tightness and shortness of breath.    Cardiovascular:  Negative for chest pain.   Gastrointestinal:  Negative for abdominal pain, blood in stool, diarrhea, nausea and vomiting.   Genitourinary:  Negative for dysuria.        Current Outpatient Medications   Medication Sig Dispense Refill    amitriptyline (ELAVIL) 50 mg tablet Take 1 tablet (50 mg total) by mouth daily at bedtime 30 tablet 1    Multiple Vitamin (multivitamin) tablet Take 1 tablet by  "mouth daily       No current facility-administered medications for this visit.       Objective:  /64   Pulse 94   Temp 98.3 °F (36.8 °C) (Temporal)   Resp 16   Ht 5' 8.25\" (1.734 m)   Wt 101 kg (222 lb 9 oz)   SpO2 98%   BMI 33.59 kg/m²    Wt Readings from Last 3 Encounters:   04/30/25 101 kg (222 lb 9 oz)   04/04/25 101 kg (222 lb 3.2 oz)   03/19/25 102 kg (224 lb)      BP Readings from Last 3 Encounters:   04/30/25 104/64   04/04/25 108/72   03/19/25 102/82          Physical Exam    Lab Results:      Lab Results   Component Value Date    WBC 4.54 12/18/2024    HGB 16.0 12/18/2024    HCT 47.6 12/18/2024     12/18/2024    TRIG 211 (H) 12/18/2024    HDL 48 12/18/2024    LDLDIRECT 168 (H) 12/18/2024    ALT 31 12/18/2024    AST 26 12/18/2024    K 4.0 12/18/2024     12/18/2024    CREATININE 0.80 12/18/2024    BUN 16 12/18/2024    CO2 28 12/18/2024    TSH 2.87 03/13/2021    INR 1.0 03/05/2021    GLUF 86 12/18/2024    HGBA1C 5.0 12/18/2024     No results found for: \"URICACID\"  Invalid input(s): \"BASENAME\" Vitamin D    No results found.     POCT Labs        Depression Screening and Follow-up Plan: Patient was screened for depression during today's encounter. They screened negative with a PHQ-9 score of 3.                      "

## 2025-06-11 ENCOUNTER — APPOINTMENT (OUTPATIENT)
Dept: LAB | Facility: CLINIC | Age: 35
End: 2025-06-11
Payer: COMMERCIAL

## 2025-06-11 ENCOUNTER — APPOINTMENT (OUTPATIENT)
Dept: LAB | Facility: CLINIC | Age: 35
End: 2025-06-11
Attending: PREVENTIVE MEDICINE
Payer: COMMERCIAL

## 2025-06-11 ENCOUNTER — TRANSCRIBE ORDERS (OUTPATIENT)
Dept: LAB | Facility: CLINIC | Age: 35
End: 2025-06-11

## 2025-06-11 ENCOUNTER — OFFICE VISIT (OUTPATIENT)
Dept: FAMILY MEDICINE CLINIC | Facility: CLINIC | Age: 35
End: 2025-06-11
Payer: COMMERCIAL

## 2025-06-11 ENCOUNTER — RESULTS FOLLOW-UP (OUTPATIENT)
Dept: FAMILY MEDICINE CLINIC | Facility: CLINIC | Age: 35
End: 2025-06-11

## 2025-06-11 VITALS
RESPIRATION RATE: 16 BRPM | HEIGHT: 68 IN | BODY MASS INDEX: 34.25 KG/M2 | TEMPERATURE: 97.9 F | HEART RATE: 106 BPM | SYSTOLIC BLOOD PRESSURE: 120 MMHG | WEIGHT: 226 LBS | DIASTOLIC BLOOD PRESSURE: 80 MMHG | OXYGEN SATURATION: 97 %

## 2025-06-11 DIAGNOSIS — E78.1 HYPERTRIGLYCERIDEMIA: ICD-10-CM

## 2025-06-11 DIAGNOSIS — F32.5 MAJOR DEPRESSIVE DISORDER IN FULL REMISSION, UNSPECIFIED WHETHER RECURRENT (HCC): Primary | ICD-10-CM

## 2025-06-11 DIAGNOSIS — E78.5 HYPERLIPIDEMIA, UNSPECIFIED HYPERLIPIDEMIA TYPE: ICD-10-CM

## 2025-06-11 DIAGNOSIS — E66.9 OBESITY (BMI 35.0-39.9 WITHOUT COMORBIDITY): ICD-10-CM

## 2025-06-11 DIAGNOSIS — E66.01 SEVERE OBESITY (BMI 35.0-39.9) WITH COMORBIDITY (HCC): ICD-10-CM

## 2025-06-11 DIAGNOSIS — Z00.8 HEALTH EXAMINATION IN POPULATION SURVEYS: Primary | ICD-10-CM

## 2025-06-11 DIAGNOSIS — Z13.1 SCREENING FOR DIABETES MELLITUS: ICD-10-CM

## 2025-06-11 DIAGNOSIS — Z13.6 SCREENING FOR CARDIOVASCULAR CONDITION: ICD-10-CM

## 2025-06-11 DIAGNOSIS — K58.9 IRRITABLE BOWEL SYNDROME, UNSPECIFIED TYPE: ICD-10-CM

## 2025-06-11 DIAGNOSIS — Z00.8 HEALTH EXAMINATION IN POPULATION SURVEYS: ICD-10-CM

## 2025-06-11 DIAGNOSIS — F32.5 MAJOR DEPRESSIVE DISORDER IN FULL REMISSION, UNSPECIFIED WHETHER RECURRENT (HCC): ICD-10-CM

## 2025-06-11 DIAGNOSIS — F41.9 ANXIETY: ICD-10-CM

## 2025-06-11 DIAGNOSIS — E66.812 CLASS 2 SEVERE OBESITY WITH SERIOUS COMORBIDITY AND BODY MASS INDEX (BMI) OF 35.0 TO 35.9 IN ADULT, UNSPECIFIED OBESITY TYPE (HCC): ICD-10-CM

## 2025-06-11 DIAGNOSIS — E66.01 CLASS 2 SEVERE OBESITY WITH SERIOUS COMORBIDITY AND BODY MASS INDEX (BMI) OF 35.0 TO 35.9 IN ADULT, UNSPECIFIED OBESITY TYPE (HCC): ICD-10-CM

## 2025-06-11 DIAGNOSIS — E66.811 CLASS 1 OBESITY WITH SERIOUS COMORBIDITY AND BODY MASS INDEX (BMI) OF 34.0 TO 34.9 IN ADULT, UNSPECIFIED OBESITY TYPE: ICD-10-CM

## 2025-06-11 LAB
25(OH)D3 SERPL-MCNC: 34.6 NG/ML (ref 30–100)
ALBUMIN SERPL BCG-MCNC: 4.3 G/DL (ref 3.5–5)
ALP SERPL-CCNC: 48 U/L (ref 34–104)
ALT SERPL W P-5'-P-CCNC: 26 U/L (ref 7–52)
ANION GAP SERPL CALCULATED.3IONS-SCNC: 7 MMOL/L (ref 4–13)
AST SERPL W P-5'-P-CCNC: 22 U/L (ref 13–39)
BASOPHILS # BLD AUTO: 0.05 THOUSANDS/ÂΜL (ref 0–0.1)
BASOPHILS NFR BLD AUTO: 1 % (ref 0–1)
BILIRUB SERPL-MCNC: 0.43 MG/DL (ref 0.2–1)
BUN SERPL-MCNC: 16 MG/DL (ref 5–25)
CALCIUM SERPL-MCNC: 9.5 MG/DL (ref 8.4–10.2)
CHLORIDE SERPL-SCNC: 104 MMOL/L (ref 96–108)
CHOLEST SERPL-MCNC: 195 MG/DL (ref ?–200)
CO2 SERPL-SCNC: 28 MMOL/L (ref 21–32)
CREAT SERPL-MCNC: 0.82 MG/DL (ref 0.6–1.3)
EOSINOPHIL # BLD AUTO: 0.21 THOUSAND/ÂΜL (ref 0–0.61)
EOSINOPHIL NFR BLD AUTO: 4 % (ref 0–6)
ERYTHROCYTE [DISTWIDTH] IN BLOOD BY AUTOMATED COUNT: 12.9 % (ref 11.6–15.1)
EST. AVERAGE GLUCOSE BLD GHB EST-MCNC: 103 MG/DL
GFR SERPL CREATININE-BSD FRML MDRD: 112 ML/MIN/1.73SQ M
GLUCOSE P FAST SERPL-MCNC: 101 MG/DL (ref 65–99)
HBA1C MFR BLD: 5.2 %
HCT VFR BLD AUTO: 42.9 % (ref 36.5–49.3)
HDLC SERPL-MCNC: 43 MG/DL
HGB BLD-MCNC: 14.8 G/DL (ref 12–17)
IMM GRANULOCYTES # BLD AUTO: 0.01 THOUSAND/UL (ref 0–0.2)
IMM GRANULOCYTES NFR BLD AUTO: 0 % (ref 0–2)
LDLC SERPL CALC-MCNC: 113 MG/DL (ref 0–100)
LDLC SERPL DIRECT ASSAY-MCNC: 137 MG/DL (ref 0–100)
LYMPHOCYTES # BLD AUTO: 1.85 THOUSANDS/ÂΜL (ref 0.6–4.47)
LYMPHOCYTES NFR BLD AUTO: 34 % (ref 14–44)
MCH RBC QN AUTO: 31.6 PG (ref 26.8–34.3)
MCHC RBC AUTO-ENTMCNC: 34.5 G/DL (ref 31.4–37.4)
MCV RBC AUTO: 92 FL (ref 82–98)
MONOCYTES # BLD AUTO: 0.39 THOUSAND/ÂΜL (ref 0.17–1.22)
MONOCYTES NFR BLD AUTO: 7 % (ref 4–12)
NEUTROPHILS # BLD AUTO: 2.89 THOUSANDS/ÂΜL (ref 1.85–7.62)
NEUTS SEG NFR BLD AUTO: 54 % (ref 43–75)
NONHDLC SERPL-MCNC: 152 MG/DL
NRBC BLD AUTO-RTO: 0 /100 WBCS
PLATELET # BLD AUTO: 271 THOUSANDS/UL (ref 149–390)
PMV BLD AUTO: 10.4 FL (ref 8.9–12.7)
POTASSIUM SERPL-SCNC: 4.2 MMOL/L (ref 3.5–5.3)
PROT SERPL-MCNC: 7.3 G/DL (ref 6.4–8.4)
RBC # BLD AUTO: 4.69 MILLION/UL (ref 3.88–5.62)
SODIUM SERPL-SCNC: 139 MMOL/L (ref 135–147)
TRIGL SERPL-MCNC: 194 MG/DL (ref ?–150)
TSH SERPL DL<=0.05 MIU/L-ACNC: 2.94 UIU/ML (ref 0.45–4.5)
WBC # BLD AUTO: 5.4 THOUSAND/UL (ref 4.31–10.16)

## 2025-06-11 PROCEDURE — 83036 HEMOGLOBIN GLYCOSYLATED A1C: CPT

## 2025-06-11 PROCEDURE — 82306 VITAMIN D 25 HYDROXY: CPT

## 2025-06-11 PROCEDURE — 99214 OFFICE O/P EST MOD 30 MIN: CPT | Performed by: FAMILY MEDICINE

## 2025-06-11 PROCEDURE — 84443 ASSAY THYROID STIM HORMONE: CPT

## 2025-06-11 PROCEDURE — 36415 COLL VENOUS BLD VENIPUNCTURE: CPT

## 2025-06-11 PROCEDURE — 85025 COMPLETE CBC W/AUTO DIFF WBC: CPT

## 2025-06-11 PROCEDURE — 83721 ASSAY OF BLOOD LIPOPROTEIN: CPT

## 2025-06-11 PROCEDURE — 80053 COMPREHEN METABOLIC PANEL: CPT

## 2025-06-11 PROCEDURE — 80061 LIPID PANEL: CPT

## 2025-06-11 RX ORDER — FLUOXETINE 10 MG/1
CAPSULE ORAL
Qty: 60 CAPSULE | Refills: 1 | Status: SHIPPED | OUTPATIENT
Start: 2025-06-11

## 2025-06-11 NOTE — PROGRESS NOTES
Assessment/Plan:  Assessment & Plan  Major depressive disorder in full remission, unspecified whether recurrent (HCC)  Unchanged.  D/C Elavil 50mg QHS due to tachycardia and inefficacy.  Start Prozac 20mg QD.  Continue counseling.      Depression Screening Follow-up Plan: Patient's depression screening was positive with a PHQ-9 score of 5. Patient advised to follow-up with PCP for further management.    Orders:    FLUoxetine (PROzac) 10 mg capsule; 1 cap by mouth daily in AM x 1 week, then increase to 2 caps by mouth in AM thereafter.    Comprehensive metabolic panel; Future    Comprehensive metabolic panel; Future    Anxiety  Unchanged.  D/C Elavil 50mg QHS due to tachycardia and inefficacy.  Start Prozac 20mg QD.  Continue counseling.        Orders:    FLUoxetine (PROzac) 10 mg capsule; 1 cap by mouth daily in AM x 1 week, then increase to 2 caps by mouth in AM thereafter.    Comprehensive metabolic panel; Future    Comprehensive metabolic panel; Future     Hypertriglyceridemia  Stable s Rx. Recommend lifestyle modifications.     Orders:    Comprehensive metabolic panel; Future    Lipid panel; Future    TSH, 3rd generation with Free T4 reflex; Future    LDL cholesterol, direct; Future     Hyperlipidemia, unspecified hyperlipidemia type  Stable s Rx. Recommend lifestyle modifications.     Orders:    Comprehensive metabolic panel; Future    Lipid panel; Future    TSH, 3rd generation with Free T4 reflex; Future    LDL cholesterol, direct; Future     Class 1 obesity with serious comorbidity and body mass index (BMI) of 34.0 to 34.9 in adult, unspecified obesity type  Worsening.  Recommend lifestyle modifications.        Orders:    TSH, 3rd generation with Free T4 reflex; Future           Return in 6 weeks (on 7/23/2025) for F/U Dep/Anx, Labs; 10/11/25 4mo - HL, Anx/Dep, IBS, Labs.      Future Appointments   Date Time Provider Department Center   7/24/2025  9:30 AM Farideh Lobo DO [facility]   10/13/2025   9:30 AM Farideh Lobo DO [facility]   2025  8:00 AM Farideh Lobo DO [facility]        Subjective:     Yeison is a 35 y.o. male who presents today for a follow-up on his chronic medical conditions.        HPI:  Chief Complaint   Patient presents with    Follow-up     6 week     -- Above per clinical staff and reviewed. --      HPI      Today:    Return in 6 weeks (on 2025) for F/U Dep/Anx, Labs.        Obesity - Trying to watch diet.  No regular exercise.  Has [employer] bike at home.  He would like to join the [employer] in .       Hyperlipidemia / Hypertriglyceridemia - No meds previously.       Depression / Anxiety - On increased Elavil 50mg 1 pill QHS x 6 weeks.  Unchanged.  Mood is 40% improved.  He is feeling ok.  His wife unexpectedly asked for a separation on , 3/16/25.  Wife does not want to reconcile or attend couple's therapy. Patient does and is mourning the loss of their marriage.  Good social supports. No SI/HI/AH/VH. He is attending counseling alone every 2 weeks - Next appt .  No higher doses or other mood meds previously.  H/O of excessive ETOH use after losing job for 1 year previously - no longer drinking ETOH.      PHQ-2/9 Depression Screening    Little interest or pleasure in doing things: 0 - not at all  Feeling down, depressed, or hopeless: 2 - more than half the days  Trouble falling or staying asleep, or sleeping too much: 1 - several days  Feeling tired or having little energy: 0 - not at all  Poor appetite or overeatin - not at all  Feeling bad about yourself - or that you are a failure or have let yourself or your family down: 2 - more than half the days  Trouble concentrating on things, such as reading the newspaper or watching television: 0 - not at all  Moving or speaking so slowly that other people could have noticed. Or the opposite - being so fidgety or restless that you have been moving around a lot more than usual: 0 - not at  all  Thoughts that you would be better off dead, or of hurting yourself in some way: 0 - not at all  PHQ-9 Score: 5  PHQ-9 Interpretation: Mild depression       JAMIE-7 Flowsheet Screening      Flowsheet Row Most Recent Value   Over the last two weeks, how often have you been bothered by the following problems?     Feeling nervous, anxious, or on edge 2   Not being able to stop or control worrying 0   Worrying too much about different things 2   Trouble relaxing  0   Being so restless that it's hard to sit still 0   Becoming easily annoyed or irritable  0   Feeling afraid as if something awful might happen 0   How difficult have these problems made it for you to do your work, take care of things at home, or get along with other people?  Somewhat difficult   JAMIE Score  4               GERD - Stable.  No longer eating red meat, rarely eating milk and dairy.  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  Next appt PRN.  S/p Normal HIDA scan, U/S, CT negative for gallstones.  [facility] Dr. Carlson 5/12/22 - next appt PRN.  S/p Colonoscopy 5/26/22, EGD 2022 - GERD and erosions per patient.  Previously on Omeprazole, Tums.       IBS - Stable on Elavil 50mg QHS.              From previous note:    Return in about 6 weeks (around 4/30/2025) for F/U Dep/Anx, Labs     Patient did not complete labs 3/19/25.          Obesity - Trying to watch diet.  No regular exercise.  Has [employer] bike at home.  He would like to join the [employer] in 2025.       Hyperlipidemia / Hypertriglyceridemia - No meds previously.       Depression / Anxiety - Restart Elavil 25mg 1 pill QHS x 6 weeks.  Mood is 40% improved.  He is feeling ok.  His wife unexpectedly asked for a separation on Sunday, 3/16/25.  Wife does not want to reconcile or attend couple's therapy. Patient does and is mourning the loss of their marriage.  Good social supports. No SI/HI/AH/VH. He is attending counseling alone every 2 weeks - Next appt 5/25.  No higher doses or  "other mood meds previously.  H/O of excessive ETOH use after losing job for 1 year previously - no longer drinking ETOH.       GERD - Stable.  No longer eating red meat, rarely eating milk and dairy.  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  Next appt PRN.  S/p Normal HIDA scan, U/S, CT negative for gallstones.  Seen by [facility] Dr. Carlson 5/12/22 - next appt PRN.  S/p Colonoscopy 5/26/22, EGD 2022 - GERD and erosions per patient.  Previously on Omeprazole, Tums.       IBS - Stable s Elavil 25mg QHS.         Reviewed:  Labs 6/11/25, Gen Surgery 6/27/23        The following portions of the patient's history were reviewed and updated as appropriate: allergies, current medications, past family history, past medical history, past social history, past surgical history and problem list.      Review of Systems   Constitutional:  Negative for appetite change, chills, diaphoresis, fatigue and fever.   Respiratory:  Negative for chest tightness and shortness of breath.    Cardiovascular:  Negative for chest pain.   Gastrointestinal:  Negative for abdominal pain, blood in stool, diarrhea, nausea and vomiting.   Genitourinary:  Negative for dysuria.        Current Outpatient Medications   Medication Sig Dispense Refill    FLUoxetine (PROzac) 10 mg capsule 1 cap by mouth daily in AM x 1 week, then increase to 2 caps by mouth in AM thereafter. 60 capsule 1    Multiple Vitamin (multivitamin) tablet Take 1 tablet by mouth in the morning.       No current facility-administered medications for this visit.       Objective:  /80 (BP Location: Left arm, Patient Position: Sitting, Cuff Size: Large)   Pulse (!) 106   Temp 97.9 °F (36.6 °C) (Tympanic)   Resp 16   Ht 5' 8\" (1.727 m)   Wt 103 kg (226 lb)   SpO2 97% Comment: on RA  BMI 34.36 kg/m²    Wt Readings from Last 3 Encounters:   06/11/25 103 kg (226 lb)   04/30/25 101 kg (222 lb 9 oz)   04/04/25 101 kg (222 lb 3.2 oz)      BP Readings from Last 3 Encounters:   06/11/25 " 120/80   04/30/25 104/64   04/04/25 108/72          Physical Exam  Vitals and nursing note reviewed.   Constitutional:       General: He is not in acute distress.     Appearance: Normal appearance. He is well-developed. He is obese. He is not ill-appearing or toxic-appearing.   HENT:      Head: Normocephalic and atraumatic.     Eyes:      Conjunctiva/sclera: Conjunctivae normal.     Neck:      Thyroid: No thyromegaly.     Cardiovascular:      Rate and Rhythm: Normal rate and regular rhythm.      Pulses: Normal pulses.      Heart sounds: Normal heart sounds.   Pulmonary:      Effort: Pulmonary effort is normal.      Breath sounds: Normal breath sounds.   Abdominal:      General: Bowel sounds are normal.     Musculoskeletal:         General: No swelling or tenderness.      Cervical back: Neck supple.      Right lower leg: No edema.      Left lower leg: No edema.   Lymphadenopathy:      Cervical: No cervical adenopathy.     Neurological:      General: No focal deficit present.      Mental Status: He is alert and oriented to person, place, and time.     Psychiatric:         Attention and Perception: Attention and perception normal.         Mood and Affect: Affect normal. Mood is depressed.         Speech: Speech normal.         Behavior: Behavior normal. Behavior is cooperative.         Thought Content: Thought content normal.         Cognition and Memory: Cognition and memory normal.         Judgment: Judgment normal.         Lab Results:      Lab Results   Component Value Date    WBC 5.40 06/11/2025    HGB 14.8 06/11/2025    HCT 42.9 06/11/2025     06/11/2025    TRIG 194 (H) 06/11/2025    HDL 43 06/11/2025    LDLDIRECT 137 (H) 06/11/2025    ALT 26 06/11/2025    AST 22 06/11/2025    K 4.2 06/11/2025     06/11/2025    CREATININE 0.82 06/11/2025    BUN 16 06/11/2025    CO2 28 06/11/2025    TSH 2.87 03/13/2021    INR 1.0 03/05/2021    GLUF 101 (H) 06/11/2025    HGBA1C 5.2 06/11/2025     No results found for:  "\"URICACID\"  Invalid input(s): \"BASENAME\" Vitamin D    No results found.     POCT Labs                       "

## 2025-06-12 NOTE — ASSESSMENT & PLAN NOTE
Unchanged.  D/C Elavil 50mg QHS due to tachycardia and inefficacy.  Start Prozac 20mg QD.  Continue counseling.      Depression Screening Follow-up Plan: Patient's depression screening was positive with a PHQ-9 score of 5. Patient advised to follow-up with PCP for further management.    Orders:    FLUoxetine (PROzac) 10 mg capsule; 1 cap by mouth daily in AM x 1 week, then increase to 2 caps by mouth in AM thereafter.    Comprehensive metabolic panel; Future    Comprehensive metabolic panel; Future

## 2025-06-12 NOTE — ASSESSMENT & PLAN NOTE
Worsening.  Recommend lifestyle modifications.        Orders:    TSH, 3rd generation with Free T4 reflex; Future

## 2025-06-12 NOTE — ASSESSMENT & PLAN NOTE
Unchanged.  D/C Elavil 50mg QHS due to tachycardia and inefficacy.  Start Prozac 20mg QD.  Continue counseling.        Orders:    FLUoxetine (PROzac) 10 mg capsule; 1 cap by mouth daily in AM x 1 week, then increase to 2 caps by mouth in AM thereafter.    Comprehensive metabolic panel; Future    Comprehensive metabolic panel; Future

## 2025-06-12 NOTE — ASSESSMENT & PLAN NOTE
Stable s Rx. Recommend lifestyle modifications.     Orders:    Comprehensive metabolic panel; Future    Lipid panel; Future    TSH, 3rd generation with Free T4 reflex; Future    LDL cholesterol, direct; Future

## 2025-07-14 DIAGNOSIS — F32.5 MAJOR DEPRESSIVE DISORDER IN FULL REMISSION, UNSPECIFIED WHETHER RECURRENT (HCC): ICD-10-CM

## 2025-07-14 DIAGNOSIS — F41.9 ANXIETY: ICD-10-CM

## 2025-07-15 RX ORDER — FLUOXETINE 10 MG/1
CAPSULE ORAL
Qty: 60 CAPSULE | Refills: 0 | Status: SHIPPED | OUTPATIENT
Start: 2025-07-15 | End: 2025-07-24

## 2025-07-24 ENCOUNTER — OFFICE VISIT (OUTPATIENT)
Dept: FAMILY MEDICINE CLINIC | Facility: CLINIC | Age: 35
End: 2025-07-24
Payer: COMMERCIAL

## 2025-07-24 VITALS
WEIGHT: 229 LBS | OXYGEN SATURATION: 98 % | HEIGHT: 67 IN | SYSTOLIC BLOOD PRESSURE: 116 MMHG | TEMPERATURE: 97.8 F | DIASTOLIC BLOOD PRESSURE: 80 MMHG | BODY MASS INDEX: 35.94 KG/M2 | RESPIRATION RATE: 16 BRPM | HEART RATE: 87 BPM

## 2025-07-24 DIAGNOSIS — K58.9 IRRITABLE BOWEL SYNDROME, UNSPECIFIED TYPE: ICD-10-CM

## 2025-07-24 DIAGNOSIS — K21.00 GASTROESOPHAGEAL REFLUX DISEASE WITH ESOPHAGITIS, UNSPECIFIED WHETHER HEMORRHAGE: ICD-10-CM

## 2025-07-24 DIAGNOSIS — E66.812 CLASS 2 SEVERE OBESITY WITH SERIOUS COMORBIDITY AND BODY MASS INDEX (BMI) OF 35.0 TO 35.9 IN ADULT, UNSPECIFIED OBESITY TYPE (HCC): ICD-10-CM

## 2025-07-24 DIAGNOSIS — E66.01 CLASS 2 SEVERE OBESITY WITH SERIOUS COMORBIDITY AND BODY MASS INDEX (BMI) OF 35.0 TO 35.9 IN ADULT, UNSPECIFIED OBESITY TYPE (HCC): ICD-10-CM

## 2025-07-24 DIAGNOSIS — F32.5 MAJOR DEPRESSIVE DISORDER IN FULL REMISSION, UNSPECIFIED WHETHER RECURRENT (HCC): Primary | ICD-10-CM

## 2025-07-24 DIAGNOSIS — F41.9 ANXIETY: ICD-10-CM

## 2025-07-24 PROCEDURE — 99214 OFFICE O/P EST MOD 30 MIN: CPT | Performed by: FAMILY MEDICINE

## 2025-07-24 NOTE — ASSESSMENT & PLAN NOTE
Improved.  Continue Prozac 20mg QD.         Orders:    FLUoxetine (PROzac) 20 mg capsule; Take 1 capsule (20 mg total) by mouth daily

## 2025-07-24 NOTE — ASSESSMENT & PLAN NOTE
Stable. Continue Prozac 20mg QD.       Orders:    FLUoxetine (PROzac) 20 mg capsule; Take 1 capsule (20 mg total) by mouth daily

## 2025-07-24 NOTE — PROGRESS NOTES
Assessment/Plan:  Assessment & Plan  Major depressive disorder in full remission, unspecified whether recurrent (HCC)  Improved.  Continue Prozac 20mg QD.       Depression Screening Follow-up Plan: Patient's depression screening was positive with a PHQ-9 score of 5. Patient advised to follow-up with PCP for further management.      Orders:    FLUoxetine (PROzac) 20 mg capsule; Take 1 capsule (20 mg total) by mouth daily     Anxiety  Improved.  Continue Prozac 20mg QD.         Orders:    FLUoxetine (PROzac) 20 mg capsule; Take 1 capsule (20 mg total) by mouth daily     Irritable bowel syndrome, unspecified type  Stable. Continue Prozac 20mg QD.       Orders:    FLUoxetine (PROzac) 20 mg capsule; Take 1 capsule (20 mg total) by mouth daily     Class 2 severe obesity with serious comorbidity and body mass index (BMI) of 35.0 to 35.9 in adult, unspecified obesity type (HCC)  Stable.  Recommend lifestyle modifications.              Gastroesophageal reflux disease with esophagitis, unspecified whether hemorrhage  Improved c dietary changes.  Watch GERD diet.                     Return if symptoms worsen or fail to improve.      Future Appointments   Date Time Provider Department Center   10/13/2025  9:30 AM Farideh Lobo DO [facility]   12/23/2025  8:00 AM Farideh Lobo DO [facility]        Subjective:     Yeison is a 35 y.o. male who presents today for a follow-up on his chronic medical conditions.        HPI:  Chief Complaint   Patient presents with    Follow-up     6 week     -- Above per clinical staff and reviewed. --      HPI      Today:    Return in 6 weeks (on 7/23/2025) for F/U Dep/Anx, Labs; 10/11/25 4mo - HL, Anx/Dep, IBS, Labs.     Return in 6 weeks (on 7/23/2025) for F/U Dep/Anx, Labs    Patient did not complete labs 6/11/25.          Obesity - Trying to watch diet.  No regular exercise.  Has [employer] bike at home.  He would like to join the [employer] in 2025.       Hyperlipidemia  / Hypertriglyceridemia - No meds previously.       Depression / Anxiety - On Prozac 20mg QD x 6 weeks.  D/C Elavil 50mg 1 pill QHS due to tachycardia and inefficacy.  Mood is 60-70% improved.  He is feeling better.  His wife unexpectedly asked for a separation on 3/16/25.  Wife does not want to reconcile or attend couple's therapy. Patient does and is mourning the loss of their marriage.  Good social supports. No SI/HI/AH/VH.  He is no longer attending counseling alone every 2 weeks - Last appt .  No higher doses or other mood meds previously.  H/O of excessive ETOH use after losing job for 1 year previously - no longer drinking ETOH.         PHQ-2/9 Depression Screening    Little interest or pleasure in doing things: 0 - not at all  Feeling down, depressed, or hopeless: 0 - not at all  Trouble falling or staying asleep, or sleeping too much: 0 - not at all  Feeling tired or having little energy: 0 - not at all  Poor appetite or overeatin - not at all  Feeling bad about yourself - or that you are a failure or have let yourself or your family down: 0 - not at all  Trouble concentrating on things, such as reading the newspaper or watching television: 0 - not at all  Moving or speaking so slowly that other people could have noticed. Or the opposite - being so fidgety or restless that you have been moving around a lot more than usual: 0 - not at all  Thoughts that you would be better off dead, or of hurting yourself in some way: 0 - not at all  PHQ-9 Score: 0  PHQ-9 Interpretation: No or Minimal depression       JAMIE-7 Flowsheet Screening      Flowsheet Row Most Recent Value   Over the last two weeks, how often have you been bothered by the following problems?     Feeling nervous, anxious, or on edge 2   Not being able to stop or control worrying 0   Worrying too much about different things 0   Trouble relaxing  0   Being so restless that it's hard to sit still 0   Becoming easily annoyed or irritable  1    Feeling afraid as if something awful might happen 0   How difficult have these problems made it for you to do your work, take care of things at home, or get along with other people?  Not difficult at all   JAMIE Score  3              GERD - Stable.  No longer eating red meat, rarely eating milk and dairy.  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  Next appt PRN.  S/p Normal HIDA scan, U/S, CT negative for gallstones.  Seen by [facility] Gen Surgery Dr. Carlson 22 - next appt PRN.  S/p Colonoscopy 22, EGD  - GERD and erosions per patient.  Previously on Omeprazole, Tums.       IBS -  On Prozac 20mg QD x 6 weeks.  D/C Elavil 50mg 1 pill QHS due to tachycardia and inefficacy.  IBS previously stable on Elavil 50mg QHS.              From previous note:    Return in 6 weeks (on 2025) for F/U Dep/Anx, Labs.         Obesity - Trying to watch diet.  No regular exercise.  Has [employer] bike at home.  He would like to join the [employer] in .       Hyperlipidemia / Hypertriglyceridemia - No meds previously.       Depression / Anxiety - On increased Elavil 50mg 1 pill QHS x 6 weeks.  Unchanged.  Mood is 40% improved.  He is feeling ok.  His wife unexpectedly asked for a separation on , 3/16/25.  Wife does not want to reconcile or attend couple's therapy. Patient does and is mourning the loss of their marriage.  Good social supports. No SI/HI/AH/VH. He is attending counseling alone every 2 weeks - Next appt .  No higher doses or other mood meds previously.  H/O of excessive ETOH use after losing job for 1 year previously - no longer drinking ETOH.       PHQ-2/9 Depression Screening       Little interest or pleasure in doing things: 0 - not at all  Feeling down, depressed, or hopeless: 2 - more than half the days  Trouble falling or staying asleep, or sleeping too much: 1 - several days  Feeling tired or having little energy: 0 - not at all  Poor appetite or overeatin - not at all  Feeling bad about  yourself - or that you are a failure or have let yourself or your family down: 2 - more than half the days  Trouble concentrating on things, such as reading the newspaper or watching television: 0 - not at all  Moving or speaking so slowly that other people could have noticed. Or the opposite - being so fidgety or restless that you have been moving around a lot more than usual: 0 - not at all  Thoughts that you would be better off dead, or of hurting yourself in some way: 0 - not at all  PHQ-9 Score: 5  PHQ-9 Interpretation: Mild depression            JAMIE-7 Flowsheet Screening       Flowsheet Row Most Recent Value   Over the last two weeks, how often have you been bothered by the following problems?      Feeling nervous, anxious, or on edge 2   Not being able to stop or control worrying 0   Worrying too much about different things 2   Trouble relaxing  0   Being so restless that it's hard to sit still 0   Becoming easily annoyed or irritable  0   Feeling afraid as if something awful might happen 0   How difficult have these problems made it for you to do your work, take care of things at home, or get along with other people?  Somewhat difficult   JAMIE Score  4                   GERD - Stable.  No longer eating red meat, rarely eating milk and dairy.  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  Next appt PRN.  S/p Normal HIDA scan, U/S, CT negative for gallstones.  Seen by [facility] Dr. Carlson 5/12/22 - next appt PRN.  S/p Colonoscopy 5/26/22, EGD 2022 - GERD and erosions per patient.  Previously on Omeprazole, Tums.       IBS - Stable on Elavil 50mg QHS.                 From previous note:     Return in about 6 weeks (around 4/30/2025) for F/U Dep/Anx, Labs     Patient did not complete labs 3/19/25.          Obesity - Trying to watch diet.  No regular exercise.  Has [employer] bike at home.  He would like to join the [employer] in 2025.       Hyperlipidemia / Hypertriglyceridemia - No meds previously.       Depression  / Anxiety - Restart Elavil 25mg 1 pill QHS x 6 weeks.  Mood is 40% improved.  He is feeling ok.  His wife unexpectedly asked for a separation on Sunday, 3/16/25.  Wife does not want to reconcile or attend couple's therapy. Patient does and is mourning the loss of their marriage.  Good social supports. No SI/HI/AH/VH. He is attending counseling alone every 2 weeks - Next appt 5/25.  No higher doses or other mood meds previously.  H/O of excessive ETOH use after losing job for 1 year previously - no longer drinking ETOH.       GERD - Stable.  No longer eating red meat, rarely eating milk and dairy.  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  Next appt PRN.  S/p Normal HIDA scan, U/S, CT negative for gallstones.  Seen by [facility] Gen Surgery Dr. Carlson 5/12/22 - next appt PRN.  S/p Colonoscopy 5/26/22, EGD 2022 - GERD and erosions per patient.  Previously on Omeprazole, Tums.       IBS - Stable s Elavil 25mg QHS.         Reviewed:  Labs 6/11/25, Gen Surgery 6/27/23       The following portions of the patient's history were reviewed and updated as appropriate: allergies, current medications, past family history, past medical history, past social history, past surgical history and problem list.      Review of Systems   Constitutional:  Negative for appetite change, chills, diaphoresis, fatigue and fever.   Respiratory:  Negative for chest tightness and shortness of breath.    Cardiovascular:  Negative for chest pain.   Gastrointestinal:  Negative for abdominal pain, blood in stool, diarrhea, nausea and vomiting.   Genitourinary:  Negative for dysuria.        Current Outpatient Medications   Medication Sig Dispense Refill    FLUoxetine (PROzac) 20 mg capsule Take 1 capsule (20 mg total) by mouth daily 90 capsule 2    Multiple Vitamin (multivitamin) tablet Take 1 tablet by mouth in the morning.       No current facility-administered medications for this visit.       Objective:  /80 (BP Location: Left arm, Patient Position:  "Sitting, Cuff Size: Large)   Pulse 87   Temp 97.8 °F (36.6 °C) (Temporal)   Resp 16   Ht 5' 7\" (1.702 m)   Wt 104 kg (229 lb)   SpO2 98%   BMI 35.87 kg/m²    Wt Readings from Last 3 Encounters:   07/24/25 104 kg (229 lb)   06/11/25 103 kg (226 lb)   04/30/25 101 kg (222 lb 9 oz)      BP Readings from Last 3 Encounters:   07/24/25 116/80   06/11/25 120/80   04/30/25 104/64          Physical Exam  Vitals and nursing note reviewed.   Constitutional:       General: He is not in acute distress.     Appearance: Normal appearance. He is well-developed. He is obese. He is not ill-appearing or toxic-appearing.   HENT:      Head: Normocephalic and atraumatic.     Eyes:      Conjunctiva/sclera: Conjunctivae normal.     Neck:      Thyroid: No thyromegaly.     Cardiovascular:      Rate and Rhythm: Normal rate and regular rhythm.      Pulses: Normal pulses.      Heart sounds: Normal heart sounds.   Pulmonary:      Effort: Pulmonary effort is normal.      Breath sounds: Normal breath sounds.   Abdominal:      General: Bowel sounds are normal.     Musculoskeletal:         General: No swelling or tenderness.      Cervical back: Neck supple.      Right lower leg: No edema.      Left lower leg: No edema.   Lymphadenopathy:      Cervical: No cervical adenopathy.     Neurological:      General: No focal deficit present.      Mental Status: He is alert and oriented to person, place, and time.     Psychiatric:         Mood and Affect: Mood normal.         Behavior: Behavior normal.         Thought Content: Thought content normal.         Judgment: Judgment normal.         Lab Results:      Lab Results   Component Value Date    WBC 5.40 06/11/2025    HGB 14.8 06/11/2025    HCT 42.9 06/11/2025     06/11/2025    TRIG 194 (H) 06/11/2025    HDL 43 06/11/2025    LDLDIRECT 137 (H) 06/11/2025    ALT 26 06/11/2025    AST 22 06/11/2025    K 4.2 06/11/2025     06/11/2025    CREATININE 0.82 06/11/2025    BUN 16 06/11/2025    CO2 " "28 06/11/2025    TSH 2.87 03/13/2021    INR 1.0 03/05/2021    GLUF 101 (H) 06/11/2025    HGBA1C 5.2 06/11/2025     No results found for: \"URICACID\"  Invalid input(s): \"BASENAME\" Vitamin D    No results found.     POCT Labs        Depression Screening and Follow-up Plan: Patient was screened for depression during today's encounter. They screened negative with a PHQ-9 score of 0.                      "

## 2025-07-24 NOTE — ASSESSMENT & PLAN NOTE
Improved.  Continue Prozac 20mg QD.       Depression Screening Follow-up Plan: Patient's depression screening was positive with a PHQ-9 score of 5. Patient advised to follow-up with PCP for further management.      Orders:    FLUoxetine (PROzac) 20 mg capsule; Take 1 capsule (20 mg total) by mouth daily

## (undated) DEVICE — GLOVE INDICATOR PI UNDERGLOVE SZ 8 BLUE

## (undated) DEVICE — NEEDLE 25G X 1 1/2

## (undated) DEVICE — PENCIL ELECTROSURG E-Z CLEAN -0035H

## (undated) DEVICE — ELECTRODE NEEDLE E-Z CLEAN 2.75IN 7CM -0013

## (undated) DEVICE — INTENDED FOR TISSUE SEPARATION, AND OTHER PROCEDURES THAT REQUIRE A SHARP SURGICAL BLADE TO PUNCTURE OR CUT.: Brand: BARD-PARKER SAFETY BLADES SIZE 15, STERILE

## (undated) DEVICE — MINOR PROCEDURE DRAPE: Brand: CONVERTORS

## (undated) DEVICE — CHLORHEXIDINE 4PCT 4 OZ

## (undated) DEVICE — SUT SILK 2-0 TIES 144 IN LA55G

## (undated) DEVICE — GLOVE SRG BIOGEL 7.5

## (undated) DEVICE — BETHLEHEM UNIVERSAL OUTPATIENT: Brand: CARDINAL HEALTH

## (undated) DEVICE — PREMIUM DRY TRAY LF: Brand: MEDLINE INDUSTRIES, INC.